# Patient Record
Sex: FEMALE | Race: WHITE | NOT HISPANIC OR LATINO | Employment: FULL TIME | URBAN - METROPOLITAN AREA
[De-identification: names, ages, dates, MRNs, and addresses within clinical notes are randomized per-mention and may not be internally consistent; named-entity substitution may affect disease eponyms.]

---

## 2017-11-02 ENCOUNTER — APPOINTMENT (OUTPATIENT)
Dept: PREADMISSION TESTING | Facility: HOSPITAL | Age: 62
End: 2017-11-02

## 2017-11-02 ENCOUNTER — HOSPITAL ENCOUNTER (OUTPATIENT)
Dept: GENERAL RADIOLOGY | Facility: HOSPITAL | Age: 62
Discharge: HOME | End: 2017-11-02
Admitting: ORTHOPAEDIC SURGERY

## 2017-11-02 VITALS — WEIGHT: 260.8 LBS | HEIGHT: 62 IN | BODY MASS INDEX: 47.99 KG/M2

## 2017-11-02 LAB
ABO GROUP BLD: NORMAL
ALBUMIN SERPL-MCNC: 4.8 G/DL (ref 3.2–4.8)
ALBUMIN/GLOB SERPL: 1.8 G/DL (ref 1.5–2.5)
ALP SERPL-CCNC: 108 U/L (ref 25–100)
ALT SERPL W P-5'-P-CCNC: 25 U/L (ref 7–40)
ANION GAP SERPL CALCULATED.3IONS-SCNC: 11 MMOL/L (ref 3–11)
AST SERPL-CCNC: 19 U/L (ref 0–33)
BACTERIA UR QL AUTO: ABNORMAL /HPF
BASOPHILS # BLD AUTO: 0.03 10*3/MM3 (ref 0–0.2)
BASOPHILS NFR BLD AUTO: 0.5 % (ref 0–1)
BILIRUB SERPL-MCNC: 0.8 MG/DL (ref 0.3–1.2)
BILIRUB UR QL STRIP: NEGATIVE
BLD GP AB SCN SERPL QL: NEGATIVE
BUN BLD-MCNC: 16 MG/DL (ref 9–23)
BUN/CREAT SERPL: 16 (ref 7–25)
CALCIUM SPEC-SCNC: 9.8 MG/DL (ref 8.7–10.4)
CHLORIDE SERPL-SCNC: 105 MMOL/L (ref 99–109)
CLARITY UR: CLEAR
CO2 SERPL-SCNC: 24 MMOL/L (ref 20–31)
COLOR UR: YELLOW
CREAT BLD-MCNC: 1 MG/DL (ref 0.6–1.3)
DEPRECATED RDW RBC AUTO: 45.9 FL (ref 37–54)
EOSINOPHIL # BLD AUTO: 0.15 10*3/MM3 (ref 0–0.3)
EOSINOPHIL NFR BLD AUTO: 2.4 % (ref 0–3)
ERYTHROCYTE [DISTWIDTH] IN BLOOD BY AUTOMATED COUNT: 14 % (ref 11.3–14.5)
GFR SERPL CREATININE-BSD FRML MDRD: 57 ML/MIN/1.73
GLOBULIN UR ELPH-MCNC: 2.6 GM/DL
GLUCOSE BLD-MCNC: 115 MG/DL (ref 70–100)
GLUCOSE UR STRIP-MCNC: NEGATIVE MG/DL
HBA1C MFR BLD: 5.8 % (ref 4.8–5.6)
HCT VFR BLD AUTO: 43.3 % (ref 34.5–44)
HGB BLD-MCNC: 14.4 G/DL (ref 11.5–15.5)
HGB UR QL STRIP.AUTO: NEGATIVE
HYALINE CASTS UR QL AUTO: ABNORMAL /LPF
IMM GRANULOCYTES # BLD: 0.01 10*3/MM3 (ref 0–0.03)
IMM GRANULOCYTES NFR BLD: 0.2 % (ref 0–0.6)
KETONES UR QL STRIP: NEGATIVE
LEUKOCYTE ESTERASE UR QL STRIP.AUTO: NEGATIVE
LYMPHOCYTES # BLD AUTO: 2.59 10*3/MM3 (ref 0.6–4.8)
LYMPHOCYTES NFR BLD AUTO: 41.8 % (ref 24–44)
MCH RBC QN AUTO: 29.6 PG (ref 27–31)
MCHC RBC AUTO-ENTMCNC: 33.3 G/DL (ref 32–36)
MCV RBC AUTO: 88.9 FL (ref 80–99)
MONOCYTES # BLD AUTO: 0.43 10*3/MM3 (ref 0–1)
MONOCYTES NFR BLD AUTO: 6.9 % (ref 0–12)
NEUTROPHILS # BLD AUTO: 2.98 10*3/MM3 (ref 1.5–8.3)
NEUTROPHILS NFR BLD AUTO: 48.2 % (ref 41–71)
NITRITE UR QL STRIP: NEGATIVE
PH UR STRIP.AUTO: 7.5 [PH] (ref 5–8)
PLATELET # BLD AUTO: 314 10*3/MM3 (ref 150–450)
PMV BLD AUTO: 10.8 FL (ref 6–12)
POTASSIUM BLD-SCNC: 3.9 MMOL/L (ref 3.5–5.5)
PROT SERPL-MCNC: 7.4 G/DL (ref 5.7–8.2)
PROT UR QL STRIP: NEGATIVE
RBC # BLD AUTO: 4.87 10*6/MM3 (ref 3.89–5.14)
RBC # UR: ABNORMAL /HPF
REF LAB TEST METHOD: ABNORMAL
RH BLD: POSITIVE
SODIUM BLD-SCNC: 140 MMOL/L (ref 132–146)
SP GR UR STRIP: <=1.005 (ref 1–1.03)
SQUAMOUS #/AREA URNS HPF: ABNORMAL /HPF
UROBILINOGEN UR QL STRIP: NORMAL
WBC NRBC COR # BLD: 6.19 10*3/MM3 (ref 3.5–10.8)
WBC UR QL AUTO: ABNORMAL /HPF

## 2017-11-02 PROCEDURE — 81001 URINALYSIS AUTO W/SCOPE: CPT | Performed by: ORTHOPAEDIC SURGERY

## 2017-11-02 PROCEDURE — 93005 ELECTROCARDIOGRAM TRACING: CPT

## 2017-11-02 PROCEDURE — 83036 HEMOGLOBIN GLYCOSYLATED A1C: CPT | Performed by: ORTHOPAEDIC SURGERY

## 2017-11-02 PROCEDURE — 86900 BLOOD TYPING SEROLOGIC ABO: CPT | Performed by: ORTHOPAEDIC SURGERY

## 2017-11-02 PROCEDURE — 86901 BLOOD TYPING SEROLOGIC RH(D): CPT | Performed by: ORTHOPAEDIC SURGERY

## 2017-11-02 PROCEDURE — 71020 HC CHEST PA AND LATERAL: CPT

## 2017-11-02 PROCEDURE — 36415 COLL VENOUS BLD VENIPUNCTURE: CPT

## 2017-11-02 PROCEDURE — 93010 ELECTROCARDIOGRAM REPORT: CPT | Performed by: INTERNAL MEDICINE

## 2017-11-02 PROCEDURE — 85025 COMPLETE CBC W/AUTO DIFF WBC: CPT | Performed by: ORTHOPAEDIC SURGERY

## 2017-11-02 PROCEDURE — 86850 RBC ANTIBODY SCREEN: CPT | Performed by: ORTHOPAEDIC SURGERY

## 2017-11-02 PROCEDURE — 80053 COMPREHEN METABOLIC PANEL: CPT | Performed by: ORTHOPAEDIC SURGERY

## 2017-11-02 RX ORDER — ROSUVASTATIN CALCIUM 10 MG/1
10 TABLET, COATED ORAL DAILY
COMMUNITY

## 2017-11-02 RX ORDER — TRIAMTERENE AND HYDROCHLOROTHIAZIDE 37.5; 25 MG/1; MG/1
1 CAPSULE ORAL EVERY MORNING
COMMUNITY

## 2017-11-02 RX ORDER — ACETAMINOPHEN,DIPHENHYDRAMINE HCL 500; 25 MG/1; MG/1
1 TABLET, FILM COATED ORAL NIGHTLY PRN
COMMUNITY

## 2017-11-02 ASSESSMENT — KOOS JR: KOOS JR SCORE: 12

## 2017-11-02 NOTE — PAT
Nishi in dr mejia's office notified of pt taking amoxacillin po for sinus infection, last dose 10-31-17, pt denies fever chills, states sinus drainage is clear

## 2017-11-02 NOTE — DISCHARGE INSTRUCTIONS
The following information and instructions were given:    NPO after MN except sips of water with routine prescribed medication (except blood thinner, diabetes, or weight reducing medication) unless otherwise instructed by your physician.  Do not eat, drink, smoke or chew gum after MN the night before surgery. This also includes no mints.    DO NOT shave, wear makeup or dark nail polish.    Remove all jewelry (advised to go to jeweler if unable to remove).    Leave anything you consider valuable at home.    Leave your suitcase in the car until after your surgery.    Bring the following with you (if applicable)   -picture ID and insurance cards   -Co-pay/deductible required by insurance   -Medications in the original bottles (not a list) including all over-the-counter  medications if not brought to PAT   -Copy of advance directive, living will or power of  documents if not  brought to PAT   -CPAP or BIPAP mask and tubing (do not bring machine)   -Skin prep instructions sheet   -PAT Pass   Education booklet, brochure, handout or binder given to patient.    Pain Control After Surgery handout given to patient.    Respirex use (handout given to patient) and pneumonia prevention.    Signs and Symptoms of infection.    DVT Prevention stressing the importance of ambulation.    Patient to apply Chlorhexadine wipes to surgical area (as instructed) the night before procedure and the AM of procedure.

## 2017-11-02 NOTE — PAT
MEASURED FOR TEDS/SCDS IN PAT    CALF MEASUREMENT    20.5in  LENGTH MEASUREMENT 15in    Patient to apply Chlorhexadine wipes  to surgical area (as instructed) the night before procedure and the AM of procedure. Wipes provided.    Pt attended joint class today 11-2-17

## 2017-11-13 ENCOUNTER — ANESTHESIA (OUTPATIENT)
Dept: PERIOP | Facility: HOSPITAL | Age: 62
End: 2017-11-13

## 2017-11-13 ENCOUNTER — HOSPITAL ENCOUNTER (INPATIENT)
Facility: HOSPITAL | Age: 62
LOS: 2 days | Discharge: HOME HEALTH | End: 2017-11-15
Attending: ORTHOPAEDIC SURGERY | Admitting: ORTHOPAEDIC SURGERY

## 2017-11-13 ENCOUNTER — APPOINTMENT (OUTPATIENT)
Dept: GENERAL RADIOLOGY | Facility: HOSPITAL | Age: 62
End: 2017-11-13

## 2017-11-13 ENCOUNTER — ANESTHESIA EVENT (OUTPATIENT)
Dept: PERIOP | Facility: HOSPITAL | Age: 62
End: 2017-11-13

## 2017-11-13 DIAGNOSIS — Z78.9 IMPAIRED MOBILITY AND ADLS: ICD-10-CM

## 2017-11-13 DIAGNOSIS — Z74.09 IMPAIRED FUNCTIONAL MOBILITY, BALANCE, GAIT, AND ENDURANCE: Primary | ICD-10-CM

## 2017-11-13 DIAGNOSIS — Z74.09 IMPAIRED MOBILITY AND ADLS: ICD-10-CM

## 2017-11-13 PROBLEM — E78.5 HLD (HYPERLIPIDEMIA): Status: ACTIVE | Noted: 2017-11-13

## 2017-11-13 PROBLEM — M17.11 ARTHRITIS OF RIGHT KNEE: Status: ACTIVE | Noted: 2017-11-13

## 2017-11-13 PROBLEM — R73.03 PREDIABETES: Status: ACTIVE | Noted: 2017-11-13

## 2017-11-13 PROBLEM — I10 HTN (HYPERTENSION): Status: ACTIVE | Noted: 2017-11-13

## 2017-11-13 LAB
ABO GROUP BLD: NORMAL
BLD GP AB SCN SERPL QL: NEGATIVE
GLUCOSE BLDC GLUCOMTR-MCNC: 135 MG/DL (ref 70–130)
POTASSIUM BLDA-SCNC: 3.59 MMOL/L (ref 3.5–5.3)
RH BLD: POSITIVE

## 2017-11-13 PROCEDURE — 63710000001 INSULIN LISPRO (HUMAN) PER 5 UNITS: Performed by: NURSE PRACTITIONER

## 2017-11-13 PROCEDURE — 73560 X-RAY EXAM OF KNEE 1 OR 2: CPT

## 2017-11-13 PROCEDURE — 25010000003 CEFAZOLIN IN DEXTROSE 2-4 GM/100ML-% SOLUTION: Performed by: ORTHOPAEDIC SURGERY

## 2017-11-13 PROCEDURE — 25010000002 PROPOFOL 1000 MG/ML EMULSION: Performed by: NURSE ANESTHETIST, CERTIFIED REGISTERED

## 2017-11-13 PROCEDURE — 84132 ASSAY OF SERUM POTASSIUM: CPT | Performed by: ANESTHESIOLOGY

## 2017-11-13 PROCEDURE — 25010000002 HYDROMORPHONE PER 4 MG: Performed by: ORTHOPAEDIC SURGERY

## 2017-11-13 PROCEDURE — 97162 PT EVAL MOD COMPLEX 30 MIN: CPT

## 2017-11-13 PROCEDURE — C1713 ANCHOR/SCREW BN/BN,TIS/BN: HCPCS | Performed by: ORTHOPAEDIC SURGERY

## 2017-11-13 PROCEDURE — 25010000002 KETOROLAC TROMETHAMINE PER 15 MG: Performed by: ORTHOPAEDIC SURGERY

## 2017-11-13 PROCEDURE — 25010000002 ROPIVACAINE PER 1 MG: Performed by: NURSE ANESTHETIST, CERTIFIED REGISTERED

## 2017-11-13 PROCEDURE — 86900 BLOOD TYPING SEROLOGIC ABO: CPT | Performed by: ORTHOPAEDIC SURGERY

## 2017-11-13 PROCEDURE — 97116 GAIT TRAINING THERAPY: CPT

## 2017-11-13 PROCEDURE — 82962 GLUCOSE BLOOD TEST: CPT

## 2017-11-13 PROCEDURE — 86901 BLOOD TYPING SEROLOGIC RH(D): CPT | Performed by: ORTHOPAEDIC SURGERY

## 2017-11-13 PROCEDURE — 86850 RBC ANTIBODY SCREEN: CPT | Performed by: ORTHOPAEDIC SURGERY

## 2017-11-13 PROCEDURE — 25010000002 ONDANSETRON PER 1 MG: Performed by: NURSE PRACTITIONER

## 2017-11-13 PROCEDURE — 94799 UNLISTED PULMONARY SVC/PX: CPT

## 2017-11-13 PROCEDURE — C1755 CATHETER, INTRASPINAL: HCPCS | Performed by: ORTHOPAEDIC SURGERY

## 2017-11-13 PROCEDURE — 0SRC0J9 REPLACEMENT OF RIGHT KNEE JOINT WITH SYNTHETIC SUBSTITUTE, CEMENTED, OPEN APPROACH: ICD-10-PCS | Performed by: ORTHOPAEDIC SURGERY

## 2017-11-13 PROCEDURE — C1776 JOINT DEVICE (IMPLANTABLE): HCPCS | Performed by: ORTHOPAEDIC SURGERY

## 2017-11-13 PROCEDURE — 86923 COMPATIBILITY TEST ELECTRIC: CPT

## 2017-11-13 DEVICE — ATTUNE KNEE SYSTEM TIBIAL BASE ROTATING PLATFORM SIZE 5 CEMENTED
Type: IMPLANTABLE DEVICE | Site: KNEE | Status: FUNCTIONAL
Brand: ATTUNE

## 2017-11-13 DEVICE — ATTUNE PATELLA MEDIALIZED ANATOMIC 35MM CEMENTED AOX
Type: IMPLANTABLE DEVICE | Site: KNEE | Status: FUNCTIONAL
Brand: ATTUNE

## 2017-11-13 DEVICE — TOTL KN ATTUNE DEPUY 9527038: Type: IMPLANTABLE DEVICE | Site: KNEE | Status: FUNCTIONAL

## 2017-11-13 DEVICE — ATTUNE KNEE SYSTEM TIBIAL INSERT ROTATING PLATFORM CRUCIATE RETAINING SIZE 5 5MM AOX
Type: IMPLANTABLE DEVICE | Site: KNEE | Status: FUNCTIONAL
Brand: ATTUNE

## 2017-11-13 DEVICE — ATTUNE KNEE SYSTEM FEMORAL CRUCIATE RETAINING SIZE 5 RIGHT CEMENTED
Type: IMPLANTABLE DEVICE | Site: KNEE | Status: FUNCTIONAL
Brand: ATTUNE

## 2017-11-13 DEVICE — SMARTSET HIGH PERFORMANCE MV MEDIUM VISCOSITY BONE CEMENT 40G
Type: IMPLANTABLE DEVICE | Site: KNEE | Status: FUNCTIONAL
Brand: SMARTSET

## 2017-11-13 RX ORDER — OXYCODONE HYDROCHLORIDE AND ACETAMINOPHEN 5; 325 MG/1; MG/1
1 TABLET ORAL EVERY 4 HOURS PRN
Status: DISCONTINUED | OUTPATIENT
Start: 2017-11-13 | End: 2017-11-15 | Stop reason: HOSPADM

## 2017-11-13 RX ORDER — TRANEXAMIC ACID 100 MG/ML
INJECTION, SOLUTION INTRAVENOUS AS NEEDED
Status: DISCONTINUED | OUTPATIENT
Start: 2017-11-13 | End: 2017-11-13 | Stop reason: SURG

## 2017-11-13 RX ORDER — FENTANYL CITRATE 50 UG/ML
50 INJECTION, SOLUTION INTRAMUSCULAR; INTRAVENOUS
Status: DISCONTINUED | OUTPATIENT
Start: 2017-11-13 | End: 2017-11-13 | Stop reason: HOSPADM

## 2017-11-13 RX ORDER — KETOROLAC TROMETHAMINE 15 MG/ML
15 INJECTION, SOLUTION INTRAMUSCULAR; INTRAVENOUS EVERY 6 HOURS PRN
Status: DISPENSED | OUTPATIENT
Start: 2017-11-13 | End: 2017-11-14

## 2017-11-13 RX ORDER — ASPIRIN 325 MG
325 TABLET, DELAYED RELEASE (ENTERIC COATED) ORAL DAILY
Status: DISCONTINUED | OUTPATIENT
Start: 2017-11-14 | End: 2017-11-15 | Stop reason: HOSPADM

## 2017-11-13 RX ORDER — MAGNESIUM HYDROXIDE 1200 MG/15ML
LIQUID ORAL AS NEEDED
Status: DISCONTINUED | OUTPATIENT
Start: 2017-11-13 | End: 2017-11-13 | Stop reason: HOSPADM

## 2017-11-13 RX ORDER — ACETAMINOPHEN 650 MG/1
650 SUPPOSITORY RECTAL ONCE AS NEEDED
Status: DISCONTINUED | OUTPATIENT
Start: 2017-11-13 | End: 2017-11-13 | Stop reason: HOSPADM

## 2017-11-13 RX ORDER — ONDANSETRON 2 MG/ML
4 INJECTION INTRAMUSCULAR; INTRAVENOUS EVERY 6 HOURS PRN
Status: DISCONTINUED | OUTPATIENT
Start: 2017-11-13 | End: 2017-11-15 | Stop reason: HOSPADM

## 2017-11-13 RX ORDER — PROMETHAZINE HYDROCHLORIDE 25 MG/ML
6.25 INJECTION, SOLUTION INTRAMUSCULAR; INTRAVENOUS ONCE AS NEEDED
Status: DISCONTINUED | OUTPATIENT
Start: 2017-11-13 | End: 2017-11-13 | Stop reason: HOSPADM

## 2017-11-13 RX ORDER — LIDOCAINE HYDROCHLORIDE 10 MG/ML
0.5 INJECTION, SOLUTION EPIDURAL; INFILTRATION; INTRACAUDAL; PERINEURAL ONCE AS NEEDED
Status: COMPLETED | OUTPATIENT
Start: 2017-11-13 | End: 2017-11-13

## 2017-11-13 RX ORDER — BISACODYL 10 MG
10 SUPPOSITORY, RECTAL RECTAL DAILY PRN
Status: DISCONTINUED | OUTPATIENT
Start: 2017-11-13 | End: 2017-11-15 | Stop reason: HOSPADM

## 2017-11-13 RX ORDER — ACETAMINOPHEN 160 MG/5ML
650 SOLUTION ORAL EVERY 4 HOURS PRN
Status: DISCONTINUED | OUTPATIENT
Start: 2017-11-13 | End: 2017-11-15 | Stop reason: HOSPADM

## 2017-11-13 RX ORDER — NALOXONE HCL 0.4 MG/ML
0.1 VIAL (ML) INJECTION
Status: DISCONTINUED | OUTPATIENT
Start: 2017-11-13 | End: 2017-11-15 | Stop reason: HOSPADM

## 2017-11-13 RX ORDER — HYDRALAZINE HYDROCHLORIDE 20 MG/ML
10 INJECTION INTRAMUSCULAR; INTRAVENOUS EVERY 6 HOURS PRN
Status: DISCONTINUED | OUTPATIENT
Start: 2017-11-13 | End: 2017-11-15 | Stop reason: HOSPADM

## 2017-11-13 RX ORDER — HYDROMORPHONE HYDROCHLORIDE 1 MG/ML
0.5 INJECTION, SOLUTION INTRAMUSCULAR; INTRAVENOUS; SUBCUTANEOUS
Status: DISCONTINUED | OUTPATIENT
Start: 2017-11-13 | End: 2017-11-13 | Stop reason: HOSPADM

## 2017-11-13 RX ORDER — ROSUVASTATIN CALCIUM 10 MG/1
10 TABLET, COATED ORAL NIGHTLY
Status: DISCONTINUED | OUTPATIENT
Start: 2017-11-13 | End: 2017-11-15 | Stop reason: HOSPADM

## 2017-11-13 RX ORDER — BISACODYL 5 MG/1
10 TABLET, DELAYED RELEASE ORAL DAILY PRN
Status: DISCONTINUED | OUTPATIENT
Start: 2017-11-13 | End: 2017-11-15 | Stop reason: HOSPADM

## 2017-11-13 RX ORDER — DEXTROSE MONOHYDRATE 25 G/50ML
25 INJECTION, SOLUTION INTRAVENOUS
Status: DISCONTINUED | OUTPATIENT
Start: 2017-11-13 | End: 2017-11-15 | Stop reason: HOSPADM

## 2017-11-13 RX ORDER — DOCUSATE SODIUM 100 MG/1
100 CAPSULE, LIQUID FILLED ORAL 2 TIMES DAILY
Status: DISCONTINUED | OUTPATIENT
Start: 2017-11-13 | End: 2017-11-15 | Stop reason: HOSPADM

## 2017-11-13 RX ORDER — OXYCODONE AND ACETAMINOPHEN 7.5; 325 MG/1; MG/1
1 TABLET ORAL ONCE AS NEEDED
Status: DISCONTINUED | OUTPATIENT
Start: 2017-11-13 | End: 2017-11-13 | Stop reason: HOSPADM

## 2017-11-13 RX ORDER — ROPIVACAINE HYDROCHLORIDE 2 MG/ML
12 INJECTION, SOLUTION EPIDURAL; INFILTRATION CONTINUOUS
Status: DISCONTINUED | OUTPATIENT
Start: 2017-11-13 | End: 2017-11-15

## 2017-11-13 RX ORDER — ACETAMINOPHEN 325 MG/1
650 TABLET ORAL EVERY 4 HOURS PRN
Status: DISCONTINUED | OUTPATIENT
Start: 2017-11-13 | End: 2017-11-15 | Stop reason: HOSPADM

## 2017-11-13 RX ORDER — CEFAZOLIN SODIUM 2 G/100ML
2 INJECTION, SOLUTION INTRAVENOUS ONCE
Status: COMPLETED | OUTPATIENT
Start: 2017-11-13 | End: 2017-11-13

## 2017-11-13 RX ORDER — NICOTINE POLACRILEX 4 MG
15 LOZENGE BUCCAL
Status: DISCONTINUED | OUTPATIENT
Start: 2017-11-13 | End: 2017-11-15 | Stop reason: HOSPADM

## 2017-11-13 RX ORDER — FAMOTIDINE 10 MG/ML
20 INJECTION, SOLUTION INTRAVENOUS ONCE
Status: DISCONTINUED | OUTPATIENT
Start: 2017-11-13 | End: 2017-11-13

## 2017-11-13 RX ORDER — HYDROMORPHONE HYDROCHLORIDE 1 MG/ML
0.5 INJECTION, SOLUTION INTRAMUSCULAR; INTRAVENOUS; SUBCUTANEOUS
Status: DISCONTINUED | OUTPATIENT
Start: 2017-11-13 | End: 2017-11-15 | Stop reason: HOSPADM

## 2017-11-13 RX ORDER — PROMETHAZINE HYDROCHLORIDE 25 MG/1
25 SUPPOSITORY RECTAL ONCE AS NEEDED
Status: DISCONTINUED | OUTPATIENT
Start: 2017-11-13 | End: 2017-11-13 | Stop reason: HOSPADM

## 2017-11-13 RX ORDER — SODIUM CHLORIDE 0.9 % (FLUSH) 0.9 %
1-10 SYRINGE (ML) INJECTION AS NEEDED
Status: DISCONTINUED | OUTPATIENT
Start: 2017-11-13 | End: 2017-11-13 | Stop reason: HOSPADM

## 2017-11-13 RX ORDER — ACETAMINOPHEN 325 MG/1
650 TABLET ORAL ONCE AS NEEDED
Status: DISCONTINUED | OUTPATIENT
Start: 2017-11-13 | End: 2017-11-13 | Stop reason: HOSPADM

## 2017-11-13 RX ORDER — BUPIVACAINE HYDROCHLORIDE 5 MG/ML
INJECTION, SOLUTION EPIDURAL; INTRACAUDAL AS NEEDED
Status: DISCONTINUED | OUTPATIENT
Start: 2017-11-13 | End: 2017-11-13 | Stop reason: SURG

## 2017-11-13 RX ORDER — PROMETHAZINE HYDROCHLORIDE 25 MG/1
25 TABLET ORAL ONCE AS NEEDED
Status: DISCONTINUED | OUTPATIENT
Start: 2017-11-13 | End: 2017-11-13 | Stop reason: HOSPADM

## 2017-11-13 RX ORDER — CEFAZOLIN SODIUM 2 G/100ML
2 INJECTION, SOLUTION INTRAVENOUS EVERY 8 HOURS
Status: COMPLETED | OUTPATIENT
Start: 2017-11-13 | End: 2017-11-14

## 2017-11-13 RX ORDER — HYDROCODONE BITARTRATE AND ACETAMINOPHEN 5; 325 MG/1; MG/1
1 TABLET ORAL EVERY 4 HOURS PRN
Status: DISCONTINUED | OUTPATIENT
Start: 2017-11-13 | End: 2017-11-15 | Stop reason: HOSPADM

## 2017-11-13 RX ORDER — SODIUM CHLORIDE 0.9 % (FLUSH) 0.9 %
1-10 SYRINGE (ML) INJECTION AS NEEDED
Status: DISCONTINUED | OUTPATIENT
Start: 2017-11-13 | End: 2017-11-15 | Stop reason: HOSPADM

## 2017-11-13 RX ORDER — SODIUM CHLORIDE, SODIUM LACTATE, POTASSIUM CHLORIDE, CALCIUM CHLORIDE 600; 310; 30; 20 MG/100ML; MG/100ML; MG/100ML; MG/100ML
9 INJECTION, SOLUTION INTRAVENOUS CONTINUOUS
Status: DISCONTINUED | OUTPATIENT
Start: 2017-11-13 | End: 2017-11-15 | Stop reason: HOSPADM

## 2017-11-13 RX ORDER — FAMOTIDINE 20 MG/1
20 TABLET, FILM COATED ORAL ONCE
Status: COMPLETED | OUTPATIENT
Start: 2017-11-13 | End: 2017-11-13

## 2017-11-13 RX ORDER — IPRATROPIUM BROMIDE AND ALBUTEROL SULFATE 2.5; .5 MG/3ML; MG/3ML
3 SOLUTION RESPIRATORY (INHALATION) ONCE AS NEEDED
Status: DISCONTINUED | OUTPATIENT
Start: 2017-11-13 | End: 2017-11-13 | Stop reason: HOSPADM

## 2017-11-13 RX ORDER — SODIUM CHLORIDE 9 MG/ML
150 INJECTION, SOLUTION INTRAVENOUS CONTINUOUS
Status: DISCONTINUED | OUTPATIENT
Start: 2017-11-13 | End: 2017-11-15 | Stop reason: HOSPADM

## 2017-11-13 RX ORDER — ONDANSETRON 2 MG/ML
4 INJECTION INTRAMUSCULAR; INTRAVENOUS ONCE AS NEEDED
Status: DISCONTINUED | OUTPATIENT
Start: 2017-11-13 | End: 2017-11-13 | Stop reason: HOSPADM

## 2017-11-13 RX ADMIN — SODIUM CHLORIDE, POTASSIUM CHLORIDE, SODIUM LACTATE AND CALCIUM CHLORIDE: 600; 310; 30; 20 INJECTION, SOLUTION INTRAVENOUS at 15:01

## 2017-11-13 RX ADMIN — CEFAZOLIN SODIUM 2 G: 2 INJECTION, SOLUTION INTRAVENOUS at 13:58

## 2017-11-13 RX ADMIN — KETOROLAC TROMETHAMINE 15 MG: 15 INJECTION, SOLUTION INTRAMUSCULAR; INTRAVENOUS at 18:33

## 2017-11-13 RX ADMIN — SODIUM CHLORIDE, POTASSIUM CHLORIDE, SODIUM LACTATE AND CALCIUM CHLORIDE 9 ML/HR: 600; 310; 30; 20 INJECTION, SOLUTION INTRAVENOUS at 09:46

## 2017-11-13 RX ADMIN — TRANEXAMIC ACID 1000 MG: 100 INJECTION, SOLUTION INTRAVENOUS at 14:30

## 2017-11-13 RX ADMIN — CEFAZOLIN SODIUM 2 G: 2 INJECTION, SOLUTION INTRAVENOUS at 21:34

## 2017-11-13 RX ADMIN — ROSUVASTATIN CALCIUM 10 MG: 10 TABLET ORAL at 21:34

## 2017-11-13 RX ADMIN — SODIUM CHLORIDE 150 ML/HR: 9 INJECTION, SOLUTION INTRAVENOUS at 19:10

## 2017-11-13 RX ADMIN — BUPIVACAINE HYDROCHLORIDE 11 ML: 5 INJECTION, SOLUTION EPIDURAL; INTRACAUDAL; PERINEURAL at 14:11

## 2017-11-13 RX ADMIN — OXYCODONE AND ACETAMINOPHEN 1 TABLET: 5; 325 TABLET ORAL at 21:34

## 2017-11-13 RX ADMIN — ROPIVACAINE HYDROCHLORIDE 12 ML/HR: 2 INJECTION, SOLUTION EPIDURAL; INFILTRATION at 16:01

## 2017-11-13 RX ADMIN — TRANEXAMIC ACID 1000 MG: 100 INJECTION, SOLUTION INTRAVENOUS at 15:19

## 2017-11-13 RX ADMIN — PROPOFOL 100 MCG/KG/MIN: 10 INJECTION, EMULSION INTRAVENOUS at 14:20

## 2017-11-13 RX ADMIN — HYDROMORPHONE HYDROCHLORIDE 0.5 MG: 1 INJECTION, SOLUTION INTRAMUSCULAR; INTRAVENOUS; SUBCUTANEOUS at 17:51

## 2017-11-13 RX ADMIN — LIDOCAINE HYDROCHLORIDE 0.5 ML: 10 INJECTION, SOLUTION EPIDURAL; INFILTRATION; INTRACAUDAL; PERINEURAL at 09:46

## 2017-11-13 RX ADMIN — FAMOTIDINE 20 MG: 20 TABLET, FILM COATED ORAL at 09:52

## 2017-11-13 RX ADMIN — ONDANSETRON 4 MG: 2 INJECTION INTRAMUSCULAR; INTRAVENOUS at 18:33

## 2017-11-13 NOTE — ANESTHESIA POSTPROCEDURE EVALUATION
Patient: Mel Brady    Procedure Summary     Date Anesthesia Start Anesthesia Stop Room / Location    11/13/17 1358 1601 [facility] / [facility]       Procedure Diagnosis Surgeon Provider    TOTAL KNEE ARTHROPLASTY RIGHT (Right Knee) No diagnosis on file. MD Ernst Tate MD          Anesthesia Type: spinal  Last vitals  BP   110/65 (11/13/17 1555)   Temp   98.4 °F (36.9 °C) (11/13/17 1555)   Pulse   73 (11/13/17 1555)   Resp   16 (11/13/17 1555)     SpO2   100 % (11/13/17 1555)     Post Anesthesia Care and Evaluation    Patient location during evaluation: PACU  Patient participation: complete - patient participated  Level of consciousness: awake and alert  Pain score: 0  Pain management: adequate  Airway patency: patent  Anesthetic complications: No anesthetic complications  PONV Status: none  Cardiovascular status: hemodynamically stable and acceptable  Respiratory status: nonlabored ventilation, acceptable and nasal cannula  Hydration status: acceptable

## 2017-11-13 NOTE — ADDENDUM NOTE
Addendum  created 11/13/17 1652 by Promise Li, TAWANDA    Anesthesia Intra Flowsheets edited, Anesthesia Intra Meds edited, Visit Navigator Flowsheet section accepted

## 2017-11-13 NOTE — THERAPY EVALUATION
Acute Care - Physical Therapy Initial Evaluation   [facility]     Patient Name: Mel Brady  : 1955  MRN: [id number]  Today's Date: 2017   Onset of Illness/Injury or Date of Surgery Date: 17  Date of Referral to PT: 17  Referring Physician: MD Gus      Admit Date: 2017     Visit Dx:    ICD-10-CM ICD-9-CM   1. Impaired functional mobility, balance, gait, and endurance Z74.09 V49.89     Patient Active Problem List   Diagnosis   • Arthritis of right knee   • HTN   • HLD (hyperlipidemia)   • Prediabetes     Past Medical History:   Diagnosis Date   • Arthritis    • Hypercholesteremia    • Hypertension    • PONV (postoperative nausea and vomiting)    • Wears contact lenses    • Wears glasses      Past Surgical History:   Procedure Laterality Date   • COLONOSCOPY     • KNEE ARTHROSCOPY W/ MEDIAL COLLATERAL LIGAMENT (MCL) REPAIR Left    • MEDIAL COLLATERAL LIGAMENT REPAIR, KNEE Right    • NECK SURGERY     • SHOULDER SURGERY Left           PT ASSESSMENT (last 72 hours)      PT Evaluation       17 1801       Rehab Evaluation    Document Type evaluation  -LR     Subjective Information agree to therapy;complains of;pain  -LR     Patient Effort, Rehab Treatment good  -LR     Symptoms Noted During/After Treatment fatigue;increased pain  -LR     General Information    Patient Profile Review yes  -LR     Onset of Illness/Injury or Date of Surgery Date 17  -LR     Referring Physician MD Gus  -LR     General Observations Patient supine in bed upon arrival. IV, R adductor canal nerve catheter, R knee ace bandaged, SCDs. Parents present.   -LR     Pertinent History Of Current Problem Patient presents for surgical management of persistent and progressive R hip pain and dysfunction that has failed to improve with conservative management.   -LR     Precautions/Limitations fall precautions;other (see comments)   R adductor canal nerve catheter.  -LR     Prior Level of Function  min assist:;all household mobility;community mobility;gait;transfer;bed mobility;home management;cooking;cleaning;shopping;using stairs;independent:;driving   all mobility limited by pain  -LR     Equipment Currently Used at Home bath bench;raised toilet;walker, rolling;cane, straight   not currently using  -LR     Plans/Goals Discussed With patient and family;agreed upon  -LR     Risks Reviewed patient and family:;LOB;nausea/vomiting;dizziness;increased discomfort;lines disloged  -LR     Benefits Reviewed patient and family:;improve function;increase independence;increase strength;increase balance;decrease pain;increase knowledge  -LR     Barriers to Rehab previous functional deficit  -LR     Living Environment    Lives With alone  -LR     Living Arrangements house  -LR     Home Accessibility bed and bath on same level;house is not wheelchair accessible;stairs to enter home;tub/shower is not walk in  -LR     Number of Stairs to Enter Home 3  -LR     Stair Railings at Home outside, present on left side  -LR     Type of Financial/Environmental Concern none  -LR     Transportation Available family or friend will provide  -LR     Living Environment Comment Patient's parents will be available to assist her at all times upon d/c home.   -LR     Clinical Impression    Date of Referral to PT 11/13/17  -LR     PT Diagnosis s/p elective R TKA  -LR     Prognosis good  -LR     Patient/Family Goals Statement go home, decrease pain  -LR     Criteria for Skilled Therapeutic Interventions Met yes;treatment indicated  -LR     Rehab Potential good, to achieve stated therapy goals  -LR     Pain Assessment    Pain Assessment 0-10  -LR     Pain Score 4  -LR     Post Pain Score 5  -LR     Pain Type Acute pain  -LR     Pain Location Knee  -LR     Pain Orientation Right;Anterior  -LR     Pain Intervention(s) Repositioned;Ambulation/increased activity  -LR     Vision Assessment/Intervention    Visual Impairment WFL with corrective lenses   -LR     Cognitive Assessment/Intervention    Current Cognitive/Communication Assessment functional  -LR     Orientation Status oriented x 4;required verbal cueing (specifiy in comments)  -LR     Follows Commands/Answers Questions 100% of the time;able to follow single-step instructions;needs cueing;needs repetition  -LR     Personal Safety WNL/WFL  -LR     ROM (Range of Motion)    General ROM lower extremity range of motion deficits identified  -LR     General LE Assessment    ROM LLE ROM was WFL;knee, right: LE ROM deficit  -LR     ROM Detail R knee AROM impaired 25%  -LR     MMT (Manual Muscle Testing)    General MMT Assessment lower extremity strength deficits identified  -LR     Lower Extremity    Lower Ext Manual Muscle Testing left LE strength is WFL;right knee strength deficit  -LR     Lower Ext Manual Muscle Testing Detail R knee functionally 4-/5  -LR     Mobility Assessment/Training    Extremity Weight-Bearing Status right lower extremity  -LR     Right Lower Extremity Weight-Bearing weight-bearing as tolerated  -LR     Bed Mobility, Assessment/Treatment    Bed Mobility, Assistive Device head of bed elevated;bed rails  -LR     Bed Mob, Supine to Sit, [place] verbal cues required;minimum assist (75% patient effort)  -LR     Bed Mob, Sit to Supine, [place]er not tested   UIC at end of eval.   -LR     Bed Mobility, Safety Issues decreased use of legs for bridging/pushing  -LR     Bed Mobility, Impairments ROM decreased;strength decreased;pain  -LR     Bed Mobility, Comment Verbal cues to move LEs towards EOB and to push up from bed from behind to raise trunk into sitting and to scoot hips out to get feet on floor. Min assist required to move R LE. Denied dizziness upon sitting up.   -LR     Transfer Assessment/Treatment    Transfers, Sit-Stand [place]er verbal cues required;contact guard assist;2 person assist required  -LR     Transfers, Stand-Sit [place] verbal cues required;contact guard  assist;2 person assist required  -LR     Transfers, Sit-Stand-Sit, Assist Device rolling walker;elevated surface  -LR     Transfer, Safety Issues sequencing ability decreased;step length decreased;weight-shifting ability decreased  -LR     Transfer, Impairments ROM decreased;strength decreased;pain  -LR     Transfer, Comment Verbal cues to push up from bed to stand and to reach back for chair to lower into sitting. Verbal cues to step R LE out before t/f for comfort.   -LR     Gait Assessment/Treatment    Gait, [place] Level verbal cues required;contact guard assist;2 person assist required  -LR     Gait, Assistive Device rolling walker  -LR     Gait, Distance (Feet) 20  -LR     Gait, Gait Pattern Analysis swing-to gait  -LR     Gait, Gait Deviations right:;antalgic;forward flexed posture;step length decreased;toe-to-floor clearance decreased;weight-shifting ability decreased  -LR     Gait, Safety Issues sequencing ability decreased;step length decreased;weight-shifting ability decreased  -LR     Gait, Impairments ROM decreased;strength decreased;pain  -LR     Gait, Comment Patient ambulated with step to gait pattern at slow pace. Verbal cues for correct sequencing of steps, increased R LE weight bearing, and decreased UE weight bearing. Gait limited by fatigue and had to have chair brought up behind her to sit.   -LR     Therapy Exercises    Exercise Protocols total knee  -LR     Total Knee Exercises right:;10 reps;ankle pumps/circles;quad set;glut set   cues for technique  -LR     Sensory Assessment/Intervention    Sensory Impairment --   denies numbness/tingling;able to actively DF bilaterally  -LR     Positioning and Restraints    Pre-Treatment Position in bed  -LR     Post Treatment Position chair  -LR     In Chair notified nsg;reclined;sitting;call light within reach;encouraged to call for assist;exit alarm on;with family/caregiver;compression device;legs elevated  -LR       User Key  (r) = Recorded By,  (t) = Taken By, (c) = Cosigned By    Initials Name Provider Type    LR Allison Saunders, PT Physical Therapist          Physical Therapy Education     Title: PT OT SLP Therapies (Done)     Topic: Physical Therapy (Done)     Point: Mobility training (Done)    Learning Progress Summary    Learner Readiness Method Response Comment Documented by Status   Patient Acceptance E,D VU,NR Educated on weight bearing status and precautions. LR 11/13/17 1832 Done   Mother Acceptance E,D VU,NR Educated on weight bearing status and precautions. LR 11/13/17 1832 Done   Father Acceptance E,D VU,NR Educated on weight bearing status and precautions. LR 11/13/17 1832 Done               Point: Home exercise program (Done)    Learning Progress Summary    Learner Readiness Method Response Comment Documented by Status   Patient Acceptance E,D VU,NR Educated on weight bearing status and precautions. LR 11/13/17 1832 Done   Mother Acceptance E,D VU,NR Educated on weight bearing status and precautions. LR 11/13/17 1832 Done   Father Acceptance E,D VU,NR Educated on weight bearing status and precautions. LR 11/13/17 1832 Done               Point: Body mechanics (Done)    Learning Progress Summary    Learner Readiness Method Response Comment Documented by Status   Patient Acceptance E,D VU,NR Educated on weight bearing status and precautions. LR 11/13/17 1832 Done   Mother Acceptance E,D VU,NR Educated on weight bearing status and precautions. LR 11/13/17 1832 Done   Father Acceptance E,D VU,NR Educated on weight bearing status and precautions. LR 11/13/17 1832 Done               Point: Precautions (Done)    Learning Progress Summary    Learner Readiness Method Response Comment Documented by Status   Patient Acceptance E,D VU,NR Educated on weight bearing status and precautions. LR 11/13/17 1832 Done   Mother Acceptance E,D VU,NR Educated on weight bearing status and precautions. LR 11/13/17 1832 Done   Father Acceptance E,D VU,NR Educated  on weight bearing status and precautions. LR 11/13/17 1832 Done                      User Key     Initials Effective Dates Name Provider Type Discipline    LR 06/19/15 -  Allison Saunders, PT Physical Therapist PT                PT Recommendation and Plan  Anticipated Equipment Needs At Discharge:  (none)  Anticipated Discharge Disposition: home with assist, home with home health  Planned Therapy Interventions: balance training, bed mobility training, gait training, home exercise program, patient/family education, ROM (Range of Motion), stair training, strengthening, transfer training  PT Frequency: 2 times/day  Plan of Care Review  Plan Of Care Reviewed With: patient, father, mother  Progress: progress towards functional goals is fair  Outcome Summary/Follow up Plan: Patient ambulated 20 feet with RW, limited by pain and fatigue. Plan is d/c home with HHPT. ROM to be initiated POD#1. Will continue to progress as able.           IP PT Goals       11/13/17 1801          Bed Mobility PT LTG    Bed Mobility PT LTG, Date Established 11/13/17  -LR      Bed Mobility PT LTG, Time to Achieve 3 days  -LR      Bed Mobility PT LTG, Activity Type supine to sit/sit to supine  -LR      Bed Mobility PT LTG, [place] Level conditional independence  -LR      Bed Mobility PT LTG, Date Goal Reviewed 11/13/17  -LR      Bed Mobility PT LTG, Outcome goal ongoing  -LR      Transfer Training PT LTG    Transfer Training PT LTG, Date Established 11/13/17  -LR      Transfer Training PT LTG, Time to Achieve 3 days  -LR      Transfer Training PT LTG, Activity Type sit to stand/stand to sit  -LR      Transfer Training PT LTG, [place] Level conditional independence  -LR      Transfer Training PT LTG, Assist Device walker, rolling  -LR      Transfer Training PT  LTG, Date Goal Reviewed 11/13/17  -LR      Transfer Training PT LTG, Outcome goal ongoing  -LR      Gait Training PT LTG    Gait Training Goal PT LTG, Date Established  11/13/17  -LR      Gait Training Goal PT LTG, Time to Achieve 3 days  -LR      Gait Training Goal PT LTG, [place] Level conditional independence  -LR      Gait Training Goal PT LTG, Assist Device walker, rolling  -LR      Gait Training Goal PT LTG, Distance to Achieve 500 feet  -LR      Gait Training Goal PT LTG, Date Goal Reviewed 11/13/17  -LR      Gait Training Goal PT LTG, Outcome goal ongoing  -LR      Stair Training PT LTG    Stair Training Goal PT LTG, Date Established 11/13/17  -LR      Stair Training Goal PT LTG, Time to Achieve 3 days  -LR      Stair Training Goal PT LTG, Number of Steps 3  -LR      Stair Training Goal PT LTG, [place] Level contact guard assist  -LR      Stair Training Goal PT LTG, Assist Device 1 handrail;cane, straight  -LR      Stair Training Goal PT LTG, Date Goal Reviewed 11/13/17  -LR      Stair Training Goal PT LTG, Outcome goal ongoing  -LR      Range of Motion PT LTG    Range of Motion Goal PT LTG, Date Established 11/13/17  -LR      Range of Motion Goal PT LTG, Time to Achieve 3 days  -LR      Range fo Motion Goal PT LTG, Joint R knee  -LR      Range of Motion Goal PT LTG, AAROM Measure 0-90 degrees  -LR      Range of Motion Goal PT LTG, Date Goal Reviewed 11/13/17  -LR      Range of Motion Goal PT LTG, Outcome goal ongoing  -LR        User Key  (r) = Recorded By, (t) = Taken By, (c) = Cosigned By    Initials Name Provider Type    LR Allison Saunders, PT Physical Therapist                Outcome Measures       11/13/17 1801          How much help from another person do you currently need...    Turning from your back to your side while in flat bed without using bedrails? 3  -LR      Moving from lying on back to sitting on the side of a flat bed without bedrails? 3  -LR      Moving to and from a bed to a chair (including a wheelchair)? 3  -LR      Standing up from a chair using your arms (e.g., wheelchair, bedside chair)? 3  -LR      Climbing 3-5 steps with a  railing? 1  -LR      To walk in hospital room? 3  -LR      AM-PAC 6 Clicks Score 16  -LR      Functional Assessment    Outcome Measure Options AM-PAC 6 Clicks Basic Mobility (PT)  -LR        User Key  (r) = Recorded By, (t) = Taken By, (c) = Cosigned By    Initials Name Provider Type    LR Allison Saunders, PT Physical Therapist           Time Calculation:         PT Charges       11/13/17 1835          Time Calculation    Start Time 1801  -LR      PT Received On 11/13/17  -LR      PT Goal Re-Cert Due Date 11/23/17  -LR      Time Calculation- PT    Total Timed Code Minutes- PT 8 minute(s)  -LR        User Key  (r) = Recorded By, (t) = Taken By, (c) = Cosigned By    Initials Name Provider Type    LR Allison Saunders, PT Physical Therapist          Therapy Charges for Today     Code Description Service Date Service Provider Modifiers Qty    52709592251 HC GAIT TRAINING EA 15 MIN 11/13/2017 Allison Saunders, PT GP 1    26351502616 HC PT THER SUPP EA 15 MIN 11/13/2017 Allison Saunders, PT GP 3    34888169963 HC PT EVAL MOD COMPLEXITY 3 11/13/2017 Allison Saunders, PT GP 1          PT G-Codes  Outcome Measure Options: AM-PAC 6 Clicks Basic Mobility (PT)      Allison Saunders, PT  11/13/2017

## 2017-11-13 NOTE — OP NOTE
TOTAL KNEE ARTHROPLASTY  Progress Note    Mel Brady  11/13/2017    Pre-op Diagnosis:   Right knee OA       Post-Op Diagnosis Codes:  Right knee OA    Procedure/CPT® Codes:  55010    Procedure(s):  TOTAL KNEE ARTHROPLASTY RIGHT    Surgeon(s):  Levy Weber MD    Anesthesia: * No anesthesia type entered *    Staff:   Circulator: Margaret Ahuja RN  Scrub Person: Edmund Ramirez; Ronnie Chris  Vendor Representative: Gus Shah  Nursing Assistant: Edmond Perrin; Laya Wren  Assistant: Nicky Caceres PA-C  [place]: Lesly Fuller RN; Manish Barrientos    Estimated Blood Loss: 100 ml    Urine Voided: * No values recorded between 11/13/2017  1:58 PM and 11/13/2017  3:42 PM *    Specimens:                None      Drains:            Findings: Expected     Complications: None     Implants: Depuy Attune System                        Cemented Femur Cruciate Retaining style size 5                        Cemented Tibia size 5 with 5 mm rotating spacer                        Cemented all polyethylene Patella size 35     Indications:  62-year-old woman with endstage right knee osteoarthritic symptoms and corresponding x-ray findings with moderate to severe medial compartment changes. She had distant open medial meniscectomy. Risks benefits and indications and rationale for right total knee arthroplasty discussed at length with patient. She is aware of possible mechanical or infectious complications for TKA as well as possible perioperative medical complication. She signs her own consent after all questions are obtained.     Procedure:  While in the OR spinal anesthesia started with satisfactory level. Turned to the supine position and prepped and draped in standard fashion from mid-thigh to the ankle using the sliding leg cabrera. Standard anterior incision made medial to the patella. Medial parapatellar arthrotomy performed. Patella was everted after adequate anterolateral debridement. High grade  medial compartment changes noted and lesser grade patellofemoral and lateral compartment changes. Proximal tibia exposed with circumferential meniscectomy. ACL was removed. PCL was preserved. Proximal tibia cut made nearly perpendicular to the mechanical axis. Minor medial joint line osteophytes removed. Standard distal femoral preparation with intramedullary guides. Femur sized to #5 and tibia also to #5. Chamfer and sulcus cuts made after establishing good flexion and extension gaps. Tibia prepared in standard fashion. Patella cut with jig leaving 12 to 14 mm remnant. Patellar trial positioned and prepared accordingly. Trials at this point showed easy motion, patellar tracking and varus-valgus stability. Motion demonstrated 0/0/140 with optimal deep flexion stability with the 5 mm spacer. Trials removed and bone surfaces thoroughly irrigated. Tourniquet inflated just prior to cementations at 300 mg Hg with total tourniquet time approximately 35 minutes. Standard cement technique used for femoral and tibial  and patellar components with excess cement removed during the curing process. Final components assembled and reduced with stability and range of motion and patellar tracking similar to the trials. Wound was thoroughly irrigated and closed in layers without a drain. Standard Prineo dressing applied. Standard compression dressing applied. Final counts were correct. Patient stable to recovery having tolerated procedure well throughout.     Levy Weber MD    Date: 11/13/2017  Time: 3:44 PM

## 2017-11-13 NOTE — PLAN OF CARE
Problem: Patient Care Overview (Adult)  Goal: Plan of Care Review  Outcome: Ongoing (interventions implemented as appropriate)    11/13/17 1801   Coping/Psychosocial Response Interventions   Plan Of Care Reviewed With patient;father;mother   Patient Care Overview   Progress progress towards functional goals is fair   Outcome Evaluation   Outcome Summary/Follow up Plan Patient ambulated 20 feet with RW, limited by pain and fatigue. Plan is d/c home with HHPT. ROM to be initiated POD#1. Will continue to progress as able.          Problem: Inpatient Physical Therapy  Goal: Bed Mobility Goal LTG- PT  Outcome: Ongoing (interventions implemented as appropriate)    11/13/17 1801   Bed Mobility PT LTG   Bed Mobility PT LTG, Date Established 11/13/17   Bed Mobility PT LTG, Time to Achieve 3 days   Bed Mobility PT LTG, Activity Type supine to sit/sit to supine   Bed Mobility PT LTG, [place] Level conditional independence   Bed Mobility PT LTG, Date Goal Reviewed 11/13/17   Bed Mobility PT LTG, Outcome goal ongoing       Goal: Transfer Training Goal 1 LTG- PT  Outcome: Ongoing (interventions implemented as appropriate)    11/13/17 1801   Transfer Training PT LTG   Transfer Training PT LTG, Date Established 11/13/17   Transfer Training PT LTG, Time to Achieve 3 days   Transfer Training PT LTG, Activity Type sit to stand/stand to sit   Transfer Training PT LTG, [place] conditional independence   Transfer Training PT LTG, Assist Device walker, rolling   Transfer Training PT LTG, Date Goal Reviewed 11/13/17   Transfer Training PT LTG, Outcome goal ongoing       Goal: Gait Training Goal LTG- PT  Outcome: Ongoing (interventions implemented as appropriate)    11/13/17 1801   Gait Training PT LTG   Gait Training Goal PT LTG, Date Established 11/13/17   Gait Training Goal PT LTG, Time to Achieve 3 days   Gait Training Goal PT LTG, [place] conditional independence   Gait Training Goal PT LTG, Assist Device  walker, rolling   Gait Training Goal PT LTG, Distance to Achieve 500 feet   Gait Training Goal PT LTG, Date Goal Reviewed 11/13/17   Gait Training Goal PT LTG, Outcome goal ongoing       Goal: Stair Training Goal LTG- PT  Outcome: Ongoing (interventions implemented as appropriate)    11/13/17 1801   Stair Training PT LTG   Stair Training Goal PT LTG, Date Established 11/13/17   Stair Training Goal PT LTG, Time to Achieve 3 days   Stair Training Goal PT LTG, Number of Steps 3   Stair Training Goal PT LTG, [place] Level contact guard assist   Stair Training Goal PT LTG, Assist Device 1 handrail;cane, straight   Stair Training Goal PT LTG, Date Goal Reviewed 11/13/17   Stair Training Goal PT LTG, Outcome goal ongoing       Goal: Range of Motion Goal LTG- PT  Outcome: Ongoing (interventions implemented as appropriate)    11/13/17 1801   Range of Motion PT LTG   Range of Motion Goal PT LTG, Date Established 11/13/17   Range of Motion Goal PT LTG, Time to Achieve 3 days   Range fo Motion Goal PT LTG, Joint R knee   Range of Motion Goal PT LTG, AAROM Measure 0-90 degrees   Range of Motion Goal PT LTG, Date Goal Reviewed 11/13/17   Range of Motion Goal PT LTG, Outcome goal ongoing

## 2017-11-13 NOTE — ADDENDUM NOTE
Addendum  created 11/13/17 1701 by Promise Li, TAWANDA    Anesthesia Intra Blocks edited, Child order released for a procedure order, Order Canceled from Note, Visit Navigator Flowsheet section accepted

## 2017-11-13 NOTE — H&P
Pre-Op H&P  Mel Brady  [id number]  1955    Chief complaint: Right knee pain    HPI:    Patient is a 62 y.o.female presents with right knee pain and found to have primary OA of right knee and here today for right total knee arthroplasty     Review of Systems:  General ROS: negative for chills, fever or skin lesions;  No changes since last office visit  Cardiovascular ROS: no chest pain or dyspnea on exertion  Respiratory ROS: no cough, shortness of breath, or wheezing    Allergies: No Known Allergies    Home Meds:    Prescriptions Prior to Admission   Medication Sig Dispense Refill Last Dose   • Cetirizine HCl (ZYRTEC ALLERGY PO) Take 10 mg by mouth Daily As Needed (allergies).   Past Week at Unknown time   • diphenhydrAMINE-acetaminophen (TYLENOL PM)  MG tablet per tablet Take 1 tablet by mouth At Night As Needed for Sleep.   Past Month at Unknown time   • rosuvastatin (CRESTOR) 10 MG tablet Take 10 mg by mouth Daily.   11/12/2017 at 2200   • triamterene-hydrochlorothiazide (DYAZIDE) 37.5-25 MG per capsule Take 1 capsule by mouth Every Morning.   11/13/2017 at 0700       PMH:   Past Medical History:   Diagnosis Date   • Arthritis    • Hypercholesteremia    • Hypertension    • PONV (postoperative nausea and vomiting)    • Wears contact lenses    • Wears glasses      PSH:    Past Surgical History:   Procedure Laterality Date   • COLONOSCOPY  2015   • KNEE ARTHROSCOPY W/ MEDIAL COLLATERAL LIGAMENT (MCL) REPAIR Left 1980   • MEDIAL COLLATERAL LIGAMENT REPAIR, KNEE Right 1978   • NECK SURGERY     • SHOULDER SURGERY Left        Immunization History:  Influenza: yes 2017  Pneumococcal: no  Tetanus: unknown    Social History:   Tobacco:   History   Smoking Status   • Never Smoker   Smokeless Tobacco   • Never Used      Alcohol:     History   Alcohol Use   • Yes     Comment: rare, special occasions        Vitals:           /72 (BP Location: Right arm, Patient Position: Lying)  Pulse 68  Temp 97.5 °F  "(36.4 °C) (Temporal Artery )   Resp 18  Ht 62\" (157.5 cm)  Wt 260 lb (118 kg)  SpO2 99%  BMI 47.55 kg/m2    Physical Exam:  General Appearance:    Alert, cooperative, no distress, appears stated age   Head:    Normocephalic, without obvious abnormality, atraumatic   Lungs:     Clear to auscultation bilaterally, respirations unlabored    Heart:   Regular rate and rhythm, S1 and S2 normal, no murmur, rub    or gallop    Abdomen:    Soft, non-tender.  +bowel sounds   Breast Exam:    deferred   Genitalia:    deferred   Extremities:   Extremities normal, atraumatic, no cyanosis or edema   Skin:   Skin color, texture, turgor normal, no rashes or lesions   Neurologic:   Grossly intact   Results Review  I reviewed the patient's new clinical results.    Cancer Staging (if applicable)  Cancer Patient: __ yes _x_no __unknown; If yes, clinical stage T:__ N:__M:__, stage group or __N/A    Impression: Primary OA of right knee    Plan: Right total knee arthroplasty    Linda Williamson, APRN   11/13/2017   10:49 AM  "

## 2017-11-13 NOTE — ANESTHESIA PREPROCEDURE EVALUATION
Anesthesia Evaluation     Patient summary reviewed and Nursing notes reviewed   history of anesthetic complications: PONV  NPO Solid Status: > 8 hours  NPO Liquid Status: > 8 hours     Airway   Mallampati: II  TM distance: >3 FB  Neck ROM: full  Dental      Pulmonary    (+) shortness of breath,   (-) pneumonia, COPD, asthma  Cardiovascular     ECG reviewed    (+) hypertension, hyperlipidemia  (-) past MI, CAD, angina, cardiac stents    ROS comment: Normal EKG NSR    Neuro/Psych  (-) seizures, CVA  GI/Hepatic/Renal/Endo    (+) morbid obesity,   (-) liver disease, no renal disease, hypothyroidism    Musculoskeletal     (+) neck pain,   Myalgias: sp neck surgery   Abdominal    Substance History      OB/GYN          Other   (+) arthritis                                 Anesthesia Plan    ASA 3     spinal   (Propofol sedation    ACB/cath  post op.   )  intravenous induction   Anesthetic plan and risks discussed with patient.    Plan discussed with CRNA.

## 2017-11-13 NOTE — ANESTHESIA PROCEDURE NOTES
Peripheral Block    Patient location during procedure: post-op  Reason for block: at surgeon's request and post-op pain management  Preanesthetic Checklist  Completed: patient identified, site marked, surgical consent, pre-op evaluation, timeout performed, IV checked, risks and benefits discussed and monitors and equipment checked  Prep:  Sterile barriers:cap, gloves, mask and sterile barriers  Prep: ChloraPrep  Patient monitoring: blood pressure monitoring, continuous pulse oximetry and EKG  Procedure    Guidance:ultrasound guided  Images:still images obtained    Block Type:adductor canal block  Injection Technique:catheter  Needle Type:Tuohy and echogenic  Needle Gauge:18 G    Catheter Size:20 G (20g)  Medications  Local Injected:bupivacaine 0.25% Local Amount Injected:30 (ml)mL  Post Assessment  Injection Assessment: negative aspiration for heme, incremental injection and no paresthesia on injection  Patient Tolerance:comfortable throughout block  Complications:no  Additional Notes  Procedure:             The pt was placed in the Supine position.  The Insertion site was  prepped and Draped in sterile fashion.  The pt was anesthetized with  IV Sedation( see meds).  Skin and cutaneous tissue was infiltrated and anesthetized with 1% Lidocaine 3 mls via a 25g needle.  A BBraun 4 inch 18g echogenic needle was then  inserted approximately midline, mid-thigh and advanced In-plane with Ultrasound guidance.  Normal Saline PSF was utilized for hydrodissection of tissue.  The Vastus medialis and Sartorius muscle where visualized and the needle tip was placed in the adductor canal,  lateral to the femoral artery.  LA injection spread was visualized, injection was incremental 1-5ml, injection pressure was normal or little, no intraneural injection, no vascular injection.  LA dose was injected thru the needle(see dose above).  A BBraun 20g wire stylet catheter was placed via the needle with ultrasound visualization and  confirmation with NS fluid bolus. The labeled Catheter was then secured to skin at insertion site with skin afix and steristrips to curled catheter and CHG transparent dressing.  Thank you.

## 2017-11-13 NOTE — ANESTHESIA PROCEDURE NOTES
Spinal Block    Patient location during procedure: OR  Indication:at surgeon's request  Performed By  CRNA: NOVA ESPINOZA  Preanesthetic Checklist  Completed: patient identified, site marked, surgical consent, pre-op evaluation, timeout performed, IV checked, risks and benefits discussed and monitors and equipment checked  Spinal Block Prep:  Patient Position:sitting  Sterile Tech:cap, gloves, sterile barriers and mask  Prep:Chloraprep  Patient Monitoring:blood pressure monitoring, continuous pulse oximetry and EKG  Spinal Block Procedure  Approach:midline  Guidance:landmark technique and palpation technique  Location:L4-L5  Needle Type:Laney  Needle Gauge:25 G  Placement of Spinal needle event:cerebrospinal fluid aspirated  Paresthesia: no  Fluid Appearance:clear  Post Assessment  Patient Tolerance:patient tolerated the procedure well with no apparent complications  Complications no  Additional Notes  Procedure:  Pt assisted to sitting position, with legs in position of comfort over side of bed.  Pt. instructed in optimal spine presentation, the spine was prepped/ Draped and the skin at insertion site was anesthetized with 1% Lidocaine 2 ml.  The spinal needle was then advanced until CSF flow was obtained and LA was injected:      Marcaine 0.5% PSF injected 11 Mg.

## 2017-11-13 NOTE — H&P
Patient Name: Mel Brady  MRN: [id number]  : 1955  DOS: 2017    Attending: Levy Weber MD    Primary Care Provider: Chrissy Guerra MD      Chief complaint:  Right knee pain    Subjective   Patient is a 62 y.o. female presented for scheduled surgery by Dr. Weber. She anticipates a right total knee replacement today. Her knee has been painful for about 12 years. Conservative treatments have failed to provide long term pain relief. She denies use of assistive device for ambulation.    When seen in preop she is doing well. She denies pain. She denies nausea, shortness of breath or chest pain. No hx of DVT or PE.    ( Above is noted and agree. Pt underwent right total knee arthroplasty under spinal anesthesia, tolerated well, was admitted for further management.    Seen in her room afterwards, she c/o pain, anesthesia at bedside working on her block. No f/c/sob. Has not yet ambulated.)wy     Allergies:  No Known Allergies    Meds:  Prescriptions Prior to Admission   Medication Sig Dispense Refill Last Dose   • Cetirizine HCl (ZYRTEC ALLERGY PO) Take 10 mg by mouth Daily As Needed (allergies).   Past Week at Unknown time   • diphenhydrAMINE-acetaminophen (TYLENOL PM)  MG tablet per tablet Take 1 tablet by mouth At Night As Needed for Sleep.   Past Month at Unknown time   • rosuvastatin (CRESTOR) 10 MG tablet Take 10 mg by mouth Daily.   2017 at 2200   • triamterene-hydrochlorothiazide (DYAZIDE) 37.5-25 MG per capsule Take 1 capsule by mouth Every Morning.   2017 at 0700       History:   Past Medical History:   Diagnosis Date   • Arthritis    • Hypercholesteremia    • Hypertension    • PONV (postoperative nausea and vomiting)    • Wears contact lenses    • Wears glasses      Past Surgical History:   Procedure Laterality Date   • COLONOSCOPY     • KNEE ARTHROSCOPY W/ MEDIAL COLLATERAL LIGAMENT (MCL) REPAIR Left    • MEDIAL COLLATERAL LIGAMENT REPAIR, KNEE Right    • NECK  "SURGERY     • SHOULDER SURGERY Left      Family History   Problem Relation Age of Onset   • No Known Problems Mother    • No Known Problems Father      Social History   Substance Use Topics   • Smoking status: Never Smoker   • Smokeless tobacco: Never Used   • Alcohol use Yes      Comment: rare, special occasions    She lives alone and has no children. She works for [employer]( [place])    Review of Systems  Pertinent items are noted in HPI, all other systems reviewed and negative    Vital Signs  /72 (BP Location: Left arm, Patient Position: Lying)  Pulse 64  Temp 98.5 °F (36.9 °C)  Resp 16  Ht 62\" (157.5 cm)  Wt 260 lb (118 kg)  SpO2 99%  BMI 47.55 kg/m2    Physical Exam:    General Appearance:    Alert, cooperative, in no acute distress   Head:    Normocephalic, without obvious abnormality, atraumatic   Eyes:            Lids and lashes normal, conjunctivae and sclerae normal, no   icterus, no pallor, corneas clear    Ears:    Ears appear intact with no abnormalities noted   Neck:   No adenopathy, supple, trachea midline, no thyromegaly, no     carotid bruit, no JVD   Lungs:     Clear to auscultation,respirations regular, even and                   unlabored    Heart:    Regular rhythm and normal rate, normal S1 and S2, no            Murmur    Abdomen:     Normal bowel sounds, no masses, no organomegaly, soft        non-tender, non-distended, no guarding, no rebound                 tenderness   Genitalia:    Deferred   Extremities:   Moves all extremities well, no edema, no cyanosis, no              Redness   Postoperatively: Right knee with clean dry and intact Ace wrap in place.  Right thigh adductor canal catheter present.     Pulses:   Pulses palpable and equal bilaterally   Skin:   No bleeding, bruising or rash   Neurologic:   Cranial nerves 2 - 12 grossly intact, sensation intact   ( Postop exam: Intact flexion and dorsiflexion bilateral feet.  )      I reviewed the patient's new " clinical results.     Results for YANNA MENDOZA (MRN [id number]) as of 11/13/2017 13:48   Ref. Range 11/2/2017 10:32   Glucose Latest Ref Range: 70 - 100 mg/dL 115 (H)   Sodium Latest Ref Range: 132 - 146 mmol/L 140   Potassium Latest Ref Range: 3.5 - 5.5 mmol/L 3.9   CO2 Latest Ref Range: 20.0 - 31.0 mmol/L 24.0   Chloride Latest Ref Range: 99 - 109 mmol/L 105   Anion Gap Latest Ref Range: 3.0 - 11.0 mmol/L 11.0   Creatinine Latest Ref Range: 0.60 - 1.30 mg/dL 1.00   BUN Latest Ref Range: 9 - 23 mg/dL 16   BUN/Creatinine Ratio Latest Ref Range: 7.0 - 25.0  16.0   Calcium Latest Ref Range: 8.7 - 10.4 mg/dL 9.8   eGFR Non African Amer Latest Ref Range: >60 mL/min/1.73 57 (L)   Alkaline Phosphatase Latest Ref Range: 25 - 100 U/L 108 (H)   Total Protein Latest Ref Range: 5.7 - 8.2 g/dL 7.4   ALT (SGPT) Latest Ref Range: 7 - 40 U/L 25   AST (SGOT) Latest Ref Range: 0 - 33 U/L 19   Total Bilirubin Latest Ref Range: 0.3 - 1.2 mg/dL 0.8   Albumin Latest Ref Range: 3.20 - 4.80 g/dL 4.80   Globulin Latest Units: gm/dL 2.6   A/G Ratio Latest Ref Range: 1.5 - 2.5 g/dL 1.8   Hemoglobin A1C Latest Ref Range: 4.80 - 5.60 % 5.80 (H)   WBC Latest Ref Range: 3.50 - 10.80 10*3/mm3 6.19   RBC Latest Ref Range: 3.89 - 5.14 10*6/mm3 4.87   Hemoglobin Latest Ref Range: 11.5 - 15.5 g/dL 14.4   Hematocrit Latest Ref Range: 34.5 - 44.0 % 43.3   RDW Latest Ref Range: 11.3 - 14.5 % 14.0   MCV Latest Ref Range: 80.0 - 99.0 fL 88.9   MCH Latest Ref Range: 27.0 - 31.0 pg 29.6   MCHC Latest Ref Range: 32.0 - 36.0 g/dL 33.3   MPV Latest Ref Range: 6.0 - 12.0 fL 10.8   Platelets Latest Ref Range: 150 - 450 10*3/mm3 314     Assessment and Plan:   Active Problems:    Arthritis of right knee    HTN    HLD (hyperlipidemia)    Prediabetes    Right total knee arthroplasty.    Plan  1. PT/OT- early ambulation post op  2. Pain control-prns, AC nerve block  3. IS-encourage  4. DVT proph- mechs/ASA  5. Bowel regimen  6. Resume home medications as  appropriate  7. Monitor post-op labs  8. DC planning for home    HTN, HLD  - Continue home statin  - Hold dyazide for now  - Monitor BP q4 hrs  - Holding parameters for BP meds  - PRN hydralazine for SBP >170    PreDM  - hgb A1c on 11/2/17 5.8  - Accuchecks ACHS with low dose SSI  - DM educator consult    Seen and examined by me. Agree with above. Discussed with patient. bruna Vann MD  11/13/17  6:14 PM

## 2017-11-14 PROBLEM — Z96.651 S/P TOTAL KNEE ARTHROPLASTY, RIGHT: Status: ACTIVE | Noted: 2017-11-14

## 2017-11-14 LAB
ANION GAP SERPL CALCULATED.3IONS-SCNC: 6 MMOL/L (ref 3–11)
BASOPHILS # BLD AUTO: 0.02 10*3/MM3 (ref 0–0.2)
BASOPHILS NFR BLD AUTO: 0.2 % (ref 0–1)
BUN BLD-MCNC: 11 MG/DL (ref 9–23)
BUN/CREAT SERPL: 12.2 (ref 7–25)
CALCIUM SPEC-SCNC: 8.7 MG/DL (ref 8.7–10.4)
CHLORIDE SERPL-SCNC: 109 MMOL/L (ref 99–109)
CO2 SERPL-SCNC: 25 MMOL/L (ref 20–31)
CREAT BLD-MCNC: 0.9 MG/DL (ref 0.6–1.3)
DEPRECATED RDW RBC AUTO: 48.4 FL (ref 37–54)
EOSINOPHIL # BLD AUTO: 0.04 10*3/MM3 (ref 0–0.3)
EOSINOPHIL NFR BLD AUTO: 0.4 % (ref 0–3)
ERYTHROCYTE [DISTWIDTH] IN BLOOD BY AUTOMATED COUNT: 14.4 % (ref 11.3–14.5)
GFR SERPL CREATININE-BSD FRML MDRD: 64 ML/MIN/1.73
GLUCOSE BLD-MCNC: 104 MG/DL (ref 70–100)
GLUCOSE BLDC GLUCOMTR-MCNC: 111 MG/DL (ref 70–130)
GLUCOSE BLDC GLUCOMTR-MCNC: 115 MG/DL (ref 70–130)
HCT VFR BLD AUTO: 38.6 % (ref 34.5–44)
HGB BLD-MCNC: 12.4 G/DL (ref 11.5–15.5)
IMM GRANULOCYTES # BLD: 0.02 10*3/MM3 (ref 0–0.03)
IMM GRANULOCYTES NFR BLD: 0.2 % (ref 0–0.6)
LYMPHOCYTES # BLD AUTO: 1.93 10*3/MM3 (ref 0.6–4.8)
LYMPHOCYTES NFR BLD AUTO: 20.9 % (ref 24–44)
MCH RBC QN AUTO: 29.2 PG (ref 27–31)
MCHC RBC AUTO-ENTMCNC: 32.1 G/DL (ref 32–36)
MCV RBC AUTO: 91 FL (ref 80–99)
MONOCYTES # BLD AUTO: 0.86 10*3/MM3 (ref 0–1)
MONOCYTES NFR BLD AUTO: 9.3 % (ref 0–12)
NEUTROPHILS # BLD AUTO: 6.37 10*3/MM3 (ref 1.5–8.3)
NEUTROPHILS NFR BLD AUTO: 69 % (ref 41–71)
PLATELET # BLD AUTO: 247 10*3/MM3 (ref 150–450)
PMV BLD AUTO: 11.6 FL (ref 6–12)
POTASSIUM BLD-SCNC: 3.7 MMOL/L (ref 3.5–5.5)
RBC # BLD AUTO: 4.24 10*6/MM3 (ref 3.89–5.14)
SODIUM BLD-SCNC: 140 MMOL/L (ref 132–146)
WBC NRBC COR # BLD: 9.24 10*3/MM3 (ref 3.5–10.8)

## 2017-11-14 PROCEDURE — 80048 BASIC METABOLIC PNL TOTAL CA: CPT | Performed by: NURSE PRACTITIONER

## 2017-11-14 PROCEDURE — 97165 OT EVAL LOW COMPLEX 30 MIN: CPT

## 2017-11-14 PROCEDURE — 97116 GAIT TRAINING THERAPY: CPT

## 2017-11-14 PROCEDURE — 97110 THERAPEUTIC EXERCISES: CPT

## 2017-11-14 PROCEDURE — 97530 THERAPEUTIC ACTIVITIES: CPT

## 2017-11-14 PROCEDURE — 82962 GLUCOSE BLOOD TEST: CPT

## 2017-11-14 PROCEDURE — 25010000002 ROPIVACAINE PER 1 MG: Performed by: NURSE ANESTHETIST, CERTIFIED REGISTERED

## 2017-11-14 PROCEDURE — G0108 DIAB MANAGE TRN  PER INDIV: HCPCS | Performed by: REGISTERED NURSE

## 2017-11-14 PROCEDURE — 85025 COMPLETE CBC W/AUTO DIFF WBC: CPT | Performed by: ORTHOPAEDIC SURGERY

## 2017-11-14 PROCEDURE — 25010000003 CEFAZOLIN IN DEXTROSE 2-4 GM/100ML-% SOLUTION: Performed by: ORTHOPAEDIC SURGERY

## 2017-11-14 RX ORDER — OXYCODONE HYDROCHLORIDE AND ACETAMINOPHEN 5; 325 MG/1; MG/1
TABLET ORAL
Status: DISPENSED
Start: 2017-11-14 | End: 2017-11-15

## 2017-11-14 RX ADMIN — DOCUSATE SODIUM 100 MG: 100 CAPSULE, LIQUID FILLED ORAL at 17:18

## 2017-11-14 RX ADMIN — OXYCODONE AND ACETAMINOPHEN 1 TABLET: 5; 325 TABLET ORAL at 22:34

## 2017-11-14 RX ADMIN — HYDROCODONE BITARTRATE AND ACETAMINOPHEN 1 TABLET: 5; 325 TABLET ORAL at 21:11

## 2017-11-14 RX ADMIN — OXYCODONE AND ACETAMINOPHEN 1 TABLET: 5; 325 TABLET ORAL at 17:18

## 2017-11-14 RX ADMIN — CEFAZOLIN SODIUM 2 G: 2 INJECTION, SOLUTION INTRAVENOUS at 05:18

## 2017-11-14 RX ADMIN — ROPIVACAINE HYDROCHLORIDE 12 ML/HR: 2 INJECTION, SOLUTION EPIDURAL; INFILTRATION at 01:25

## 2017-11-14 RX ADMIN — ROPIVACAINE HYDROCHLORIDE 12 ML/HR: 2 INJECTION, SOLUTION EPIDURAL; INFILTRATION at 17:21

## 2017-11-14 RX ADMIN — ASPIRIN 325 MG: 325 TABLET, DELAYED RELEASE ORAL at 08:37

## 2017-11-14 RX ADMIN — SODIUM CHLORIDE 150 ML/HR: 9 INJECTION, SOLUTION INTRAVENOUS at 07:28

## 2017-11-14 RX ADMIN — ROPIVACAINE HYDROCHLORIDE 12 ML/HR: 2 INJECTION, SOLUTION EPIDURAL; INFILTRATION at 09:13

## 2017-11-14 RX ADMIN — OXYCODONE AND ACETAMINOPHEN 1 TABLET: 5; 325 TABLET ORAL at 12:13

## 2017-11-14 RX ADMIN — OXYCODONE AND ACETAMINOPHEN 1 TABLET: 5; 325 TABLET ORAL at 07:27

## 2017-11-14 RX ADMIN — DOCUSATE SODIUM 100 MG: 100 CAPSULE, LIQUID FILLED ORAL at 08:37

## 2017-11-14 RX ADMIN — ROSUVASTATIN CALCIUM 10 MG: 10 TABLET ORAL at 21:11

## 2017-11-14 NOTE — THERAPY TREATMENT NOTE
Acute Care - Physical Therapy Treatment Note  [facility]     Patient Name: Mel Brady  : 1955  MRN: [id number]  Today's Date: 2017  Onset of Illness/Injury or Date of Surgery Date: 17  Date of Referral to PT: 17  Referring Physician: MD Gus    Admit Date: 2017    Visit Dx:    ICD-10-CM ICD-9-CM   1. Impaired functional mobility, balance, gait, and endurance Z74.09 V49.89     Patient Active Problem List   Diagnosis   • Arthritis of right knee   • HTN   • HLD (hyperlipidemia)   • Prediabetes   • S/P total knee arthroplasty, right               Adult Rehabilitation Note       17 1054          Rehab Assessment/Intervention    Discipline physical therapist  -      Document Type therapy note (daily note)  -MJ      Subjective Information agree to therapy;no complaints  -MJ      Patient Effort, Rehab Treatment good  -MJ      Symptoms Noted During/After Treatment fatigue;increased pain  -MJ      Precautions/Limitations fall precautions;other (see comments)   R adductor nerve catheter  -MJ      Recorded by [MJ] Reagan Wang, PT      Pain Assessment    Pain Assessment 0-10  -MJ      Pain Score 0  -MJ      Post Pain Score 1  -MJ      Pain Type Acute pain  -MJ      Pain Location Knee  -MJ      Pain Orientation Right;Anterior  -MJ      Pain Intervention(s) Repositioned;Ambulation/increased activity;Cold pack  -MJ      Recorded by [MJ] Reagan Wang, ROSALIE      Cognitive Assessment/Intervention    Current Cognitive/Communication Assessment functional  -MJ      Orientation Status oriented x 4  -MJ      Follows Commands/Answers Questions 100% of the time;able to follow multi-step instructions;needs cueing  -MJ      Personal Safety WNL/WFL  -MJ      Recorded by [MJ] Reagan Wang, ROSALIE      Mobility Assessment/Training    Extremity Weight-Bearing Status right lower extremity  -MJ      Right Lower Extremity Weight-Bearing weight-bearing as tolerated  -MJ      Recorded by [MJ] Reagan Wang, PT       Bed Mobility, Assessment/Treatment    Bed Mobility, Assistive Device bed rails;head of bed elevated  -MJ      Bed Mob, Supine to Sit, Quay not tested   Pt UIC  -MJ      Bed Mob, Sit to Supine, Quay supervision required;verbal cues required  -MJ      Bed Mobility, Safety Issues decreased use of legs for bridging/pushing  -MJ      Bed Mobility, Impairments ROM decreased;strength decreased;pain  -MJ      Bed Mobility, Comment Verbal cues for sequencing, increased time and effort to perform  -      Recorded by [MJ] Reagan Wang, PT      Transfer Assessment/Treatment    Transfers, Sit-Stand Quay contact guard assist;verbal cues required  -MJ      Transfers, Stand-Sit [place] contact guard assist;verbal cues required  -MJ      Transfers, Sit-Stand-Sit, Assist Device rolling walker  -MJ      Toilet Transfer, Quay contact guard assist;verbal cues required  -MJ      Toilet Transfer, Assistive Device rolling walker   raised toilet, grab bar  -      Transfer, Safety Issues step length decreased;weight-shifting ability decreased  -MJ      Transfer, Impairments ROM decreased;strength decreased;pain  -      Transfer, Comment Verbal cues for correct hand placement and to step R LE out prior to t/f. Assisted pt to bathroom, independent with hygiene after toileting  -      Recorded by [MJ] Reagan Wang, PT      Gait Assessment/Treatment    Gait, Quay Level contact guard assist;verbal cues required  -      Gait, Assistive Device rolling walker  -      Gait, Distance (Feet) 230  -      Gait, Gait Pattern Analysis swing-through gait  -      Gait, Gait Deviations right:;antalgic;skylar decreased;decreased heel strike;step length decreased;toe-to-floor clearance decreased;weight-shifting ability decreased  -      Gait, Safety Issues sequencing ability decreased;step length decreased;weight-shifting ability decreased  -      Gait, Impairments ROM decreased;strength  decreased;pain  -MJ      Gait, Comment Pt initially demo step to gait pattern, progressed to step through with verbal cues and practice. Cues for upright posture, to stay in middle of RW and ot increase LE weight bearing, mild improvement. Gait limited by pain and fatigue  -MJ      Recorded by [MJ] Reagan Wang, PT      Therapy Exercises    Exercise Protocols total knee  -MJ      Total Knee Exercises right:;15 reps;completed protocol;with assist;ankle pumps/circles;quad set;glut set;heel slide stretch;SLR;SAQ;LAQ   Cues for technique. Michelle w/ initiation of SLR  -MJ      Recorded by [MJ] Reagan Wang, PT      Positioning and Restraints    Pre-Treatment Position sitting in chair/recliner  -MJ      Post Treatment Position bed  -MJ      In Bed notified nsg;supine;call light within reach;encouraged to call for assist;with family/caregiver  -MJ      Recorded by [MJ] Reagan Wang, PT        User Key  (r) = Recorded By, (t) = Taken By, (c) = Cosigned By    Initials Name Effective Dates     Reagan Wang, PT 03/14/16 -                 IP PT Goals       11/14/17 1127 11/13/17 1801       Bed Mobility PT LTG    Bed Mobility PT LTG, Date Established  11/13/17  -LR     Bed Mobility PT LTG, Time to Achieve  3 days  -LR     Bed Mobility PT LTG, Activity Type  supine to sit/sit to supine  -LR     Bed Mobility PT LTG, [place] Level  conditional independence  -LR     Bed Mobility PT LTG, Date Goal Reviewed 11/14/17  - 11/13/17  -LR     Bed Mobility PT LTG, Outcome goal ongoing  - goal ongoing  -LR     Transfer Training PT LTG    Transfer Training PT LTG, Date Established  11/13/17  -LR     Transfer Training PT LTG, Time to Achieve  3 days  -LR     Transfer Training PT LTG, Activity Type  sit to stand/stand to sit  -LR     Transfer Training PT LTG, [place] Level  conditional independence  -LR     Transfer Training PT LTG, Assist Device  walker, rolling  -LR     Transfer Training PT  LTG, Date Goal Reviewed 11/14/17  -  11/13/17  -LR     Transfer Training PT LTG, Outcome goal ongoing  -MJ goal ongoing  -LR     Gait Training PT LTG    Gait Training Goal PT LTG, Date Established  11/13/17  -LR     Gait Training Goal PT LTG, Time to Achieve  3 days  -LR     Gait Training Goal PT LTG, [place] Level  conditional independence  -LR     Gait Training Goal PT LTG, Assist Device  walker, rolling  -LR     Gait Training Goal PT LTG, Distance to Achieve  500 feet  -LR     Gait Training Goal PT LTG, Date Goal Reviewed 11/14/17  -MJ 11/13/17  -LR     Gait Training Goal PT LTG, Outcome goal ongoing  -MJ goal ongoing  -LR     Stair Training PT LTG    Stair Training Goal PT LTG, Date Established  11/13/17  -LR     Stair Training Goal PT LTG, Time to Achieve  3 days  -LR     Stair Training Goal PT LTG, Number of Steps  3  -LR     Stair Training Goal PT LTG, [place] Level  contact guard assist  -LR     Stair Training Goal PT LTG, Assist Device  1 handrail;cane, straight  -LR     Stair Training Goal PT LTG, Date Goal Reviewed 11/14/17  -MJ 11/13/17  -LR     Stair Training Goal PT LTG, Outcome goal ongoing  -MJ goal ongoing  -LR     Range of Motion PT LTG    Range of Motion Goal PT LTG, Date Established  11/13/17  -LR     Range of Motion Goal PT LTG, Time to Achieve  3 days  -LR     Range fo Motion Goal PT LTG, Joint  R knee  -LR     Range of Motion Goal PT LTG, AAROM Measure  0-90 degrees  -LR     Range of Motion Goal PT LTG, Date Goal Reviewed 11/14/17  -MJ 11/13/17  -LR     Range of Motion Goal PT LTG, Outcome goal ongoing  -MJ goal ongoing  -LR       User Key  (r) = Recorded By, (t) = Taken By, (c) = Cosigned By    Initials Name Provider Type    LR Allison Saunders, PT Physical Therapist    GUERLINE Wang, PT Physical Therapist          Physical Therapy Education     Title: PT OT SLP Therapies (Done)     Topic: Physical Therapy (Done)     Point: Mobility training (Done)    Learning Progress Summary    Learner Readiness Method Response  Comment Documented by Status   Patient Acceptance E,D VU,NR Reviewed gait mechanics, HEP  11/14/17 1127 Done    Acceptance TB VU  TP 11/13/17 2105 Done    Acceptance E,D VU,NR Educated on weight bearing status and precautions. LR 11/13/17 1832 Done   Family Acceptance E,D VU,NR Reviewed gait mechanics, [facility] 11/14/17 1127 Done   Mother Acceptance E,D VU,NR Educated on weight bearing status and precautions. LR 11/13/17 1832 Done   Father Acceptance E,D VU,NR Educated on weight bearing status and precautions. LR 11/13/17 1832 Done               Point: Home exercise program (Done)    Learning Progress Summary    Learner Readiness Method Response Comment Documented by Status   Patient Acceptance E,D VU,NR Reviewed gait mechanics, [facility] 11/14/17 1127 Done    Acceptance TB VU   11/13/17 2105 Done    Acceptance E,D VU,NR Educated on weight bearing status and precautions. LR 11/13/17 1832 Done   Family Acceptance E,D VU,NR Reviewed gait mechanics, [facility] 11/14/17 1127 Done   Mother Acceptance E,D VU,NR Educated on weight bearing status and precautions. LR 11/13/17 1832 Done   Father Acceptance E,D VU,NR Educated on weight bearing status and precautions. LR 11/13/17 1832 Done               Point: Body mechanics (Done)    Learning Progress Summary    Learner Readiness Method Response Comment Documented by Status   Patient Acceptance E,D VU,NR Reviewed gait mechanics, [facility] 11/14/17 1127 Done    Acceptance TB VU   11/13/17 2105 Done    Acceptance E,D VU,NR Educated on weight bearing status and precautions. LR 11/13/17 1832 Done   Family Acceptance E,D VU,NR Reviewed gait mechanics, [facility] 11/14/17 1127 Done   Mother Acceptance E,D VU,NR Educated on weight bearing status and precautions. LR 11/13/17 1832 Done   Father Acceptance E,D VU,NR Educated on weight bearing status and precautions. LR 11/13/17 1832 Done               Point: Precautions (Done)    Learning Progress Summary    Learner Readiness Method Response  Comment Documented by Status   Patient Acceptance E,D VU,NR Reviewed gait mechanics, HEP  11/14/17 1127 Done    Acceptance TB VU  TP 11/13/17 2105 Done    Acceptance E,D VU,NR Educated on weight bearing status and precautions.  11/13/17 1832 Done   Family Acceptance E,D KHALIDA,NR Reviewed gait mechanics, HEP  11/14/17 1127 Done   Mother Acceptance E,D VU,NR Educated on weight bearing status and precautions.  11/13/17 1832 Done   Father Acceptance E,D KHALIDA,NR Educated on weight bearing status and precautions.  11/13/17 1832 Done                      User Key     Initials Effective Dates Name Provider Type Discipline     06/19/15 -  Allison Saunders, PT Physical Therapist PT     03/14/16 -  Reagan Wang, PT Physical Therapist PT     07/10/17 -  Laquita Roque RN Registered Nurse Nurse                    PT Recommendation and Plan  Anticipated Equipment Needs At Discharge:  (none)  Anticipated Discharge Disposition: home with assist, home with home health  Planned Therapy Interventions: balance training, bed mobility training, gait training, home exercise program, patient/family education, ROM (Range of Motion), stair training, strengthening, transfer training  PT Frequency: 2 times/day  Plan of Care Review  Plan Of Care Reviewed With: patient  Progress: improving  Outcome Summary/Follow up Plan: Pt increased gait distance to 230 feet with CGA and RW, limited by fatigue. Will obtain ROM measure in PM and continue to progress mobility as able.           Outcome Measures       11/14/17 1054 11/13/17 1801       How much help from another person do you currently need...    Turning from your back to your side while in flat bed without using bedrails? 3  -MJ 3  -LR     Moving from lying on back to sitting on the side of a flat bed without bedrails? 3  -MJ 3  -LR     Moving to and from a bed to a chair (including a wheelchair)? 3  -MJ 3  -LR     Standing up from a chair using your arms (e.g., wheelchair,  bedside chair)? 3  -MJ 3  -LR     Climbing 3-5 steps with a railing? 3  -MJ 1  -LR     To walk in hospital room? 3  -MJ 3  -LR     AM-PAC 6 Clicks Score 18  -MJ 16  -LR     Functional Assessment    Outcome Measure Options AM-PAC 6 Clicks Basic Mobility (PT)  -MJ AM-PAC 6 Clicks Basic Mobility (PT)  -LR       User Key  (r) = Recorded By, (t) = Taken By, (c) = Cosigned By    Initials Name Provider Type    LR Allison Saunders, PT Physical Therapist    GUERLINE Wang PT Physical Therapist           Time Calculation:         PT Charges       11/14/17 1129          Time Calculation    Start Time 1054  -MJ      PT Received On 11/14/17  -      PT Goal Re-Cert Due Date 11/23/17  -      Time Calculation- PT    Total Timed Code Minutes- PT 27 minute(s)  -        User Key  (r) = Recorded By, (t) = Taken By, (c) = Cosigned By    Initials Name Provider Type    GUERLINE Wang, PT Physical Therapist          Therapy Charges for Today     Code Description Service Date Service Provider Modifiers Qty    99715697867 HC GAIT TRAINING EA 15 MIN 11/14/2017 Reagan Wang, PT GP 1    22549497988 HC PT THER PROC EA 15 MIN 11/14/2017 Reagan Wang, PT GP 1          PT G-Codes  Outcome Measure Options: AM-PAC 6 Clicks Basic Mobility (PT)    Reagan Wang PT  11/14/2017

## 2017-11-14 NOTE — PROGRESS NOTES
Discharge Planning Assessment  [facility]     Patient Name: Mel Brady  MRN: [id number]  Today's Date: 11/14/2017    Admit Date: 11/13/2017          Discharge Needs Assessment       11/14/17 1529    Living Environment    Lives With alone    Living Arrangements house    Type of Financial/Environmental Concern none    Transportation Available car;family or friend will provide    Living Environment Comment Patient lives alone in a one story home but her mother, Janina, will be staying with her for several days.      Living Environment    Provides Primary Care For no one    Quality Of Family Relationships supportive;helpful;involved    Able to Return to Prior Living Arrangements yes    Living Arrangement Comments Patient lives alone in a one story home but her mother, Janina, will be staying with her for several days.      Discharge Needs Assessment    Concerns To Be Addressed discharge planning concerns;adjustment to diagnosis/illness concerns    Readmission Within The Last 30 Days no previous admission in last 30 days    Outpatient/Agency/Support Group Needs homecare agency (specify level of care)    Anticipated Changes Related to Illness inability to care for self    Equipment Needed After Discharge none    Discharge Facility/Level Of Care Needs home with home health            Discharge Plan       11/14/17 1531    Case Management/Social Work Plan    Plan Home with mother & [facility]    Additional Comments Spoke with patient at bedside.  Mother was also at bedside.  Patient lives alone in a one story home in [facility].  Her mother will be staying with her for several days.  Patient has been independent with ADLs prior to surgery.  Denies HH.  She has a rolling walker, wheelchair and a cane at home.  She has Rx drug coverage through [employer].  Living will on file.  Plan is home and mother will stay with her to assist her.  Brother will transport patient home.   Patient has chosen [facility] for .   I called referral to Audrey with [facility] and informed her patient is to discharge home on Wednesday.  CM will follow.          Discharge Placement     No information found                Demographic Summary       11/14/17 1528    Referral Information    Admission Type inpatient    Referral Source physician    Reason For Consult discharge planning    Record Reviewed history and physical    Contact Information    Permission Granted to Share Information With     Primary Care Physician Information    Name Chrissy Guerra            Functional Status     None            Psychosocial     None            Abuse/Neglect     None            Legal     None            Substance Abuse     None            Patient Forms     None          Abby Espinoza

## 2017-11-14 NOTE — PLAN OF CARE
Problem: Patient Care Overview (Adult)  Goal: Plan of Care Review  Outcome: Ongoing (interventions implemented as appropriate)    11/14/17 0514   Coping/Psychosocial Response Interventions   Plan Of Care Reviewed With patient   Patient Care Overview   Progress no change   Outcome Evaluation   Outcome Summary/Follow up Plan Patient pain well controlled with PRN PO meds. VSS. Up with assist x1.          Problem: Perioperative Period (Adult)  Goal: Signs and Symptoms of Listed Potential Problems Will be Absent or Manageable (Perioperative Period)  Outcome: Ongoing (interventions implemented as appropriate)    Problem: Knee Replacement, Total (Adult)  Goal: Signs and Symptoms of Listed Potential Problems Will be Absent or Manageable (Knee Replacement, Total)  Outcome: Ongoing (interventions implemented as appropriate)

## 2017-11-14 NOTE — THERAPY EVALUATION
Acute Care - Occupational Therapy Initial Evaluation  [facility]     Patient Name: Mel Brady  : 1955  MRN: [id number]  Today's Date: 2017  Onset of Illness/Injury or Date of Surgery Date: 17  Date of Referral to OT: 17  Referring Physician: MD Gus    Admit Date: 2017       ICD-10-CM ICD-9-CM   1. Impaired functional mobility, balance, gait, and endurance Z74.09 V49.89   2. Impaired mobility and ADLs Z74.09 799.89     Patient Active Problem List   Diagnosis   • Arthritis of right knee   • HTN   • HLD (hyperlipidemia)   • Prediabetes   • S/P total knee arthroplasty, right     Past Medical History:   Diagnosis Date   • Arthritis    • Hypercholesteremia    • Hypertension    • PONV (postoperative nausea and vomiting)    • Wears contact lenses    • Wears glasses      Past Surgical History:   Procedure Laterality Date   • COLONOSCOPY     • KNEE ARTHROSCOPY W/ MEDIAL COLLATERAL LIGAMENT (MCL) REPAIR Left    • MEDIAL COLLATERAL LIGAMENT REPAIR, KNEE Right    • NECK SURGERY     • NC TOTAL KNEE ARTHROPLASTY Right 2017    Procedure: TOTAL KNEE ARTHROPLASTY RIGHT;  Surgeon: Levy Weber MD;  Location: [facility];  Service: Orthopedics   • SHOULDER SURGERY Left           OT ASSESSMENT FLOWSHEET (last 72 hours)      OT Evaluation       17 1054 17 0942 17 2000 17 1801       Rehab Evaluation    Document Type therapy note (daily note)  -MJ evaluation  -HK  evaluation  -LR     Subjective Information agree to therapy;no complaints  -MJ agree to therapy;no complaints  -HK  agree to therapy;complains of;pain  -LR     Patient Effort, Rehab Treatment good  -MJ good  -HK  good  -LR     Symptoms Noted During/After Treatment fatigue;increased pain  -MJ fatigue;increased pain  -HK  fatigue;increased pain  -LR     General Information    Patient Profile Review  yes  -HK  yes  -LR     Onset of Illness/Injury or Date of Surgery Date  17  -HK  17  -LR      Referring Physician  MD Gus  -HK  MD Gus  -LR     General Observations  Pt received [facility] with R adductor nerve canal cath and R knee in ace bandage. Mother at bedside.   -HK  Patient supine in bed upon arrival. IV, R adductor canal nerve catheter, R knee ace bandaged, SCDs. Parents present.   -LR     Pertinent History Of Current Problem  Pt is a 62 YOF who presents for surgical management of intractable knee pain that failed to improve with conservative measures. Pt is POD #1 s/p R TKA.  -HK  Patient presents for surgical management of persistent and progressive R hip pain and dysfunction that has failed to improve with conservative management.   -LR     Precautions/Limitations fall precautions;other (see comments)   R adductor nerve catheter  -MJ fall precautions;other (see comments)   R adductor nerve canal cath  -HK  fall precautions;other (see comments)   R adductor canal nerve catheter.  -LR     Prior Level of Function  min assist:;all household mobility;community mobility;gait;transfer;bed mobility;home management;cooking;cleaning;shopping;using stairs;ADL's  -HK  min assist:;all household mobility;community mobility;gait;transfer;bed mobility;home management;cooking;cleaning;shopping;using stairs;independent:;driving   all mobility limited by pain  -LR     Equipment Currently Used at Home  bath bench;raised toilet;walker, rolling;cane, straight  -HK  bath bench;raised toilet;walker, rolling;cane, straight   not currently using  -LR     Plans/Goals Discussed With  patient and family;agreed upon  -HK  patient and family;agreed upon  -LR     Risks Reviewed  patient and family:;LOB;nausea/vomiting;dizziness;increased discomfort;change in vital signs;increased drainage;lines disloged  -HK  patient and family:;LOB;nausea/vomiting;dizziness;increased discomfort;lines disloged  -LR     Benefits Reviewed  patient and family:;increase independence;improve function;increase strength;increase balance;decrease  pain;decrease risk of DVT;improve skin integrity;increase knowledge  -HK  patient and family:;improve function;increase independence;increase strength;increase balance;decrease pain;increase knowledge  -LR     Barriers to Rehab  none identified  -HK  previous functional deficit  -LR     Living Environment    Lives With  alone  -HK  alone  -LR     Living Arrangements  house  -HK  house  -LR     Home Accessibility  bed and bath on same level;house is not wheelchair accessible;stairs to enter home;tub/shower is not walk in  -HK  bed and bath on same level;house is not wheelchair accessible;stairs to enter home;tub/shower is not walk in  -LR     Number of Stairs to Enter Home  3  -HK  3  -LR     Stair Railings at Home  outside, present on left side  -HK  outside, present on left side  -LR     Type of Financial/Environmental Concern    none  -LR     Transportation Available    family or friend will provide  -LR     Living Environment Comment    Patient's parents will be available to assist her at all times upon d/c home.   -LR     Clinical Impression    Date of Referral to OT  11/13/17  -HK       OT Diagnosis  Decreased independence with ADLs and Mobility.  -HK       Patient/Family Goals Statement  Pt would like to have pain managed and return home.   -HK       Criteria for Skilled Therapeutic Interventions Met  yes;treatment indicated  -HK       Rehab Potential  good, to achieve stated therapy goals  -HK       Therapy Frequency  daily  -HK       Anticipated Equipment Needs At Discharge  --   to be assessed  -HK       Anticipated Discharge Disposition  home with assist;home with home health  -HK       Functional Level Prior    Ambulation   0-->independent  -TP      Transferring   0-->independent  -TP      Toileting   0-->independent  -TP      Bathing   0-->independent  -TP      Dressing   0-->independent  -TP      Eating   0-->independent  -TP      Communication   0-->understands/communicates without difficulty  -TP       Swallowing   0-->swallows foods/liquids without difficulty  -TP      Prior Functional Level Comment   No functional issue  -TP      Vital Signs    Pre Patient Position  Sitting  -HK       Intra Patient Position  Standing  -HK       Post Patient Position  Sitting  -HK       Pain Assessment    Pain Assessment 0-10  -MJ No/denies pain  -HK  0-10  -LR     Pain Score 0  -MJ   4  -LR     Post Pain Score 1  -MJ   5  -LR     Pain Type Acute pain  -MJ   Acute pain  -LR     Pain Location Knee  -MJ   Knee  -LR     Pain Orientation Right;Anterior  -MJ   Right;Anterior  -LR     Pain Intervention(s) Repositioned;Ambulation/increased activity;Cold pack  -MJ   Repositioned;Ambulation/increased activity  -LR     Vision Assessment/Intervention    Visual Impairment  WFL  -HK  WFL with corrective lenses  -LR     Cognitive Assessment/Intervention    Current Cognitive/Communication Assessment functional  -MJ functional  -HK  functional  -LR     Orientation Status oriented x 4  -MJ oriented x 4  -HK  oriented x 4;required verbal cueing (specifiy in comments)  -LR     Follows Commands/Answers Questions 100% of the time;able to follow multi-step instructions;needs cueing  -% of the time;able to follow single-step instructions  -HK  100% of the time;able to follow single-step instructions;needs cueing;needs repetition  -LR     Personal Safety WNL/WFL  -MJ WNL/WFL  -HK  WNL/WFL  -LR     ROM (Range of Motion)    General ROM  --   not formally assessed; WFL  -HK  lower extremity range of motion deficits identified  -LR     MMT (Manual Muscle Testing)    General MMT Assessment  --   not formally assessed; WFL  -HK  lower extremity strength deficits identified  -LR     Mobility Assessment/Training    Extremity Weight-Bearing Status right lower extremity  -MJ   right lower extremity  -LR     Right Lower Extremity Weight-Bearing weight-bearing as tolerated  -MJ   weight-bearing as tolerated  -LR     Bed Mobility, Assessment/Treatment    Bed  Mobility, Assistive Device bed rails;head of bed elevated  -MJ   head of bed elevated;bed rails  -LR     Bed Mob, Supine to Sit, [place] not tested   Pt UIC  -MJ   verbal cues required;minimum assist (75% patient effort)  -LR     Bed Mob, Sit to Supine, [place] supervision required;verbal cues required  -MJ   not tested   UIC at end of eval.   -LR     Bed Mobility, Safety Issues decreased use of legs for bridging/pushing  -MJ   decreased use of legs for bridging/pushing  -LR     Bed Mobility, Impairments ROM decreased;strength decreased;pain  -MJ   ROM decreased;strength decreased;pain  -LR     Bed Mobility, Comment Verbal cues for sequencing, increased time and effort to perform  -MJ Pt received UI. OT issued and educated pt on use of leg  for bed mobility, car transfers, and tub transfers.   -HK  Verbal cues to move LEs towards EOB and to push up from bed from behind to raise trunk into sitting and to scoot hips out to get feet on floor. Min assist required to move R LE. Denied dizziness upon sitting up.   -LR     Transfer Assessment/Treatment    Transfers, Sit-Stand [place] contact guard assist;verbal cues required  -MJ contact guard assist;verbal cues required  -HK  verbal cues required;contact guard assist;2 person assist required  -LR     Transfers, Stand-Sit [place] contact guard assist;verbal cues required  -MJ contact guard assist;verbal cues required  -HK  verbal cues required;contact guard assist;2 person assist required  -LR     Transfers, Sit-Stand-Sit, Assist Device rolling walker  -MJ rolling walker  -HK  rolling walker;elevated surface  -LR     Toilet Transfer, [place] contact guard assist;verbal cues required  -MJ        Toilet Transfer, Assistive Device rolling walker   raised toilet, grab bar  -MJ        Transfer, Safety Issues step length decreased;weight-shifting ability decreased  -MJ balance decreased during turns;step length decreased;weight-shifting ability  decreased  -HK  sequencing ability decreased;step length decreased;weight-shifting ability decreased  -LR     Transfer, Impairments ROM decreased;strength decreased;pain  -MJ ROM decreased;strength decreased;pain  -HK  ROM decreased;strength decreased;pain  -LR     Transfer, Comment Verbal cues for correct hand placement and to step R LE out prior to t/f. Assisted pt to bathroom, independent with hygiene after toileting  -MJ Verbal cues to push from chair and reach for chair when sitting. OT educated pt on knee precautions and how to maintain during transfers.   -HK  Verbal cues to push up from bed to stand and to reach back for chair to lower into sitting. Verbal cues to step R LE out before t/f for comfort.   -LR     Functional Mobility    Functional Mobility- Ind. Level  contact guard assist;verbal cues required  -HK       Functional Mobility- Device  rolling walker  -HK       Functional Mobility-Distance (Feet)  25  -HK       Functional Mobility- Safety Issues  step length decreased;weight-shifting ability decreased  -HK       Functional Mobility- Comment  Pt ambulated in room and back to chair.   -HK       Lower Body Bathing Assessment/Training    LB Bathing Assess/Train, Comment  OT educated pt on use of LH sponge for LB bathing. Pt states she already has one at home. OT educated and demonstrated tub transfer using tub bench.    -HK       Lower Body Dressing Assessment/Training    LB Dressing Assess/Train, Clothing Type  doffing:;donning:;slipper socks  -HK       LB Dressing Assess/Train, Assist Device  reacher;sock-aid  -HK       LB Dressing Assess/Train, Position  sitting  -HK       LB Dressing Assess/Train, [place]  supervision required;verbal cues required  -HK       LB Dressing Assess/Train, Impairments  ROM decreased;strength decreased;pain  -HK       LB Dressing Assess/Train, Comment  OT educated pt on use of AE for LBD. Pt states she has all AE she needs at home.   -HK       Therapy Exercises     Exercise Protocols total knee  -MJ   total knee  -LR     Total Knee Exercises right:;15 reps;completed protocol;with assist;ankle pumps/circles;quad set;glut set;heel slide stretch;SLR;SAQ;LAQ   Cues for technique. Michelle w/ initiation of SLR  -MJ   right:;10 reps;ankle pumps/circles;quad set;glut set   cues for technique  -LR     Sensory Assessment/Intervention    Sensory Impairment    --   denies numbness/tingling;able to actively DF bilaterally  -LR     General Therapy Interventions    Planned Therapy Interventions  adaptive equipment training;ADL retraining;bed mobility training;transfer training  -HK       Adaptive Equipment Training  OT educated pt on use of AE  -HK       ADL Retraining  OT educated pt on use of AE for LBD and LB bathing  -HK       Bed Mobility Training  OT educated pt on use of leg  for bed mobility.   -HK       Transfer Training  OT educated pt on completing transfers while maintaining knee precautions.   -HK       Positioning and Restraints    Pre-Treatment Position sitting in chair/recliner  -MJ sitting in chair/recliner  -HK  in bed  -LR     Post Treatment Position bed  -MJ chair  -HK  chair  -LR     In Bed notified nsg;supine;call light within reach;encouraged to call for assist;with family/caregiver  -MJ        In Chair  reclined;call light within reach;encouraged to call for assist;exit alarm on;with family/caregiver  -HK  notified nsg;reclined;sitting;call light within reach;encouraged to call for assist;exit alarm on;with family/caregiver;compression device;legs elevated  -LR     General LE Assessment    ROM    LLE ROM was WFL;knee, right: LE ROM deficit  -LR     ROM Detail    R knee AROM impaired 25%  -LR     Lower Extremity    Lower Ext Manual Muscle Testing    left LE strength is WFL;right knee strength deficit  -LR     Lower Ext Manual Muscle Testing Detail    R knee functionally 4-/5  -LR       User Key  (r) = Recorded By, (t) = Taken By, (c) = Cosigned By    Initials Name  Effective Dates    LR Allison Saunders, PT 06/19/15 -     GUERLINE Wang, PT 03/14/16 -     TP Laquita Roque, RN 07/10/17 -      Opal Vizcarra OT 09/28/17 -            Occupational Therapy Education     Title: PT OT SLP Therapies (Done)     Topic: Occupational Therapy (Done)     Point: ADL training (Done)    Description: Instruct learner(s) on proper safety adaptation and remediation techniques during self care or transfers.   Instruct in proper use of assistive devices.    Learning Progress Summary    Learner Readiness Method Response Comment Documented by Status   Patient Acceptance E VU OT educated pt on ADL retraining with transfers and AE, safety precautions, and appropriate body mechanics.  11/14/17 1328 Done               Point: Precautions (Done)    Description: Instruct learner(s) on prescribed precautions during self-care and functional transfers.    Learning Progress Summary    Learner Readiness Method Response Comment Documented by Status   Patient Acceptance E VU OT educated pt on ADL retraining with transfers and AE, safety precautions, and appropriate body mechanics.  11/14/17 1328 Done               Point: Body mechanics (Done)    Description: Instruct learner(s) on proper positioning and spine alignment during self-care, functional mobility activities and/or exercises.    Learning Progress Summary    Learner Readiness Method Response Comment Documented by Status   Patient Acceptance E VU OT educated pt on ADL retraining with transfers and AE, safety precautions, and appropriate body mechanics.  11/14/17 1328 Done                      User Key     Initials Effective Dates Name Provider Type Discipline     09/28/17 -  Opal Vizcarra, OT Occupational Therapist OT                  OT Recommendation and Plan  Anticipated Equipment Needs At Discharge:  (to be assessed)  Anticipated Discharge Disposition: home with assist, home with home health  Planned Therapy Interventions: adaptive  equipment training, ADL retraining, bed mobility training, transfer training  Therapy Frequency: daily  Plan of Care Review  Plan Of Care Reviewed With: patient  Progress: improving  Outcome Summary/Follow up Plan: Pt completed transfers with CGA. OT educated pt on use of AE for LBD and LBB. Pt states she does not need to purchase she has AE at home. Recommend discharge home with assist and home health.           OT Goals       11/14/17 1329          Bed Mobility OT STG    Bed Mobility OT STG, Date Established 11/14/17  -HK      Bed Mobility OT STG, Time to Achieve by discharge  -HK      Bed Mobility OT STG, Activity Type scoot/bridge;supine to sit/sit to supine  -HK      Bed Mobility OT STG, [place] Level supervision required;minimum assist (75% patient effort);verbal cues required  -HK      Bed Mobility OT STG, Outcome goal ongoing  -HK      Transfer Training OT LTG    Transfer Training OT LTG, Date Established 11/14/17  -HK      Transfer Training OT LTG, Time to Achieve by discharge  -HK      Transfer Training OT LTG, Activity Type bed to chair /chair to bed;sit to stand/stand to sit;toilet  -HK      Transfer Training OT LTG, [place] Level contact guard assist;verbal cues required  -HK      Transfer Training OT LTG, Outcome goal ongoing  -HK      Patient Education OT LTG    Patient Education OT LTG, Date Established 11/14/17  -HK      Patient Education OT LTG, Time to Achieve by discharge  -HK      Patient Education OT LTG, Education Type precautions per surgeon;positioning;posture/body mechanics;1 hand/yousuf technique;adaptive equipment mgmt  -HK      Patient Education OT LTG, Education Understanding demonstrates adequately  -HK      Patient Education OT LTG Outcome goal ongoing  -HK      LB Dressing OT LTG    LB Dressing Goal OT LTG, Date Established 11/14/17  -HK      LB Dressing Goal OT LTG, Time to Achieve by discharge  -HK      LB Dressing Goal OT LTG, Activity Type don/doff socks  -HK      LB  Dressing Goal OT LTG, [place] Level supervision required;verbal cues required  -HK      LB Dressing Goal OT LTG, Adaptive Equipment reacher;sock-aid  -HK      LB Dressing Goal OT LTG, Outcome goal met  -HK        User Key  (r) = Recorded By, (t) = Taken By, (c) = Cosigned By    Initials Name Provider Type    HK Opal Vizcarra, OT Occupational Therapist                Outcome Measures       11/14/17 1054 11/14/17 0942 11/13/17 1801    How much help from another person do you currently need...    Turning from your back to your side while in flat bed without using bedrails? 3  -MJ  3  -LR    Moving from lying on back to sitting on the side of a flat bed without bedrails? 3  -MJ  3  -LR    Moving to and from a bed to a chair (including a wheelchair)? 3  -MJ  3  -LR    Standing up from a chair using your arms (e.g., wheelchair, bedside chair)? 3  -MJ  3  -LR    Climbing 3-5 steps with a railing? 3  -MJ  1  -LR    To walk in hospital room? 3  -MJ  3  -LR    AM-PAC 6 Clicks Score 18  -MJ  16  -LR    How much help from another is currently needed...    Putting on and taking off regular lower body clothing?  3  -HK     Bathing (including washing, rinsing, and drying)  3  -HK     Toileting (which includes using toilet bed pan or urinal)  3  -HK     Putting on and taking off regular upper body clothing  4  -HK     Taking care of personal grooming (such as brushing teeth)  4  -HK     Eating meals  4  -HK     Score  21  -HK     Functional Assessment    Outcome Measure Options AM-PAC 6 Clicks Basic Mobility (PT)  -MJ AM-PAC 6 Clicks Daily Activity (OT)  -HK AM-PAC 6 Clicks Basic Mobility (PT)  -LR      User Key  (r) = Recorded By, (t) = Taken By, (c) = Cosigned By    Initials Name Provider Type    LR Allison Saunders, PT Physical Therapist    GUERLINE Wang, PT Physical Therapist    HK Opal Vizcarra, OT Occupational Therapist          Time Calculation:   OT Start Time: 0942    Therapy Charges for Today     Code Description  Service Date Service Provider Modifiers Qty    98660428764 HC OT EVAL LOW COMPLEXITY 2 11/14/2017 Opal Vizcarra OT GO 1    64246462834 HC OT THERAPEUTIC ACT EA 15 MIN 11/14/2017 Opal Vizcarra OT GO 2               Opal Vizcarra OT  11/14/2017

## 2017-11-14 NOTE — PLAN OF CARE
Problem: Patient Care Overview (Adult)  Goal: Plan of Care Review  Outcome: Ongoing (interventions implemented as appropriate)    11/14/17 1757   Coping/Psychosocial Response Interventions   Plan Of Care Reviewed With patient   Patient Care Overview   Progress improving   Outcome Evaluation   Outcome Summary/Follow up Plan Pt increased gait distance to 460 feet with CGA and RW and progressed to stair training this date. Pt's ROM measured 19-80 degrees. Pt anticipates discharge home tomorrow, will continue to progress mobility as able.          Problem: Inpatient Physical Therapy  Goal: Bed Mobility Goal LTG- PT  Outcome: Ongoing (interventions implemented as appropriate)    11/13/17 1801 11/14/17 1757   Bed Mobility PT LTG   Bed Mobility PT LTG, Date Established 11/13/17 --    Bed Mobility PT LTG, Time to Achieve 3 days --    Bed Mobility PT LTG, Activity Type supine to sit/sit to supine --    Bed Mobility PT LTG, [place] Level conditional independence --    Bed Mobility PT LTG, Date Goal Reviewed --  11/14/17   Bed Mobility PT LTG, Outcome --  goal ongoing       Goal: Transfer Training Goal 1 LTG- PT  Outcome: Ongoing (interventions implemented as appropriate)    11/13/17 1801 11/14/17 1757   Transfer Training PT LTG   Transfer Training PT LTG, Date Established 11/13/17 --    Transfer Training PT LTG, Time to Achieve 3 days --    Transfer Training PT LTG, Activity Type sit to stand/stand to sit --    Transfer Training PT LTG, [place] Level conditional independence --    Transfer Training PT LTG, Assist Device walker, rolling --    Transfer Training PT LTG, Date Goal Reviewed --  11/14/17   Transfer Training PT LTG, Outcome --  goal ongoing       Goal: Gait Training Goal LTG- PT  Outcome: Ongoing (interventions implemented as appropriate)    11/13/17 1801 11/14/17 1757   Gait Training PT LTG   Gait Training Goal PT LTG, Date Established 11/13/17 --    Gait Training Goal PT LTG, Time to Achieve 3 days --     Gait Training Goal PT LTG, [place] Level conditional independence --    Gait Training Goal PT LTG, Assist Device walker, rolling --    Gait Training Goal PT LTG, Distance to Achieve 500 feet --    Gait Training Goal PT LTG, Date Goal Reviewed --  11/14/17   Gait Training Goal PT LTG, Outcome --  goal ongoing       Goal: Stair Training Goal LTG- PT  Outcome: Outcome(s) achieved Date Met:  11/14/17 11/13/17 1801 11/14/17 1757   Stair Training PT LTG   Stair Training Goal PT LTG, Date Established 11/13/17 --    Stair Training Goal PT LTG, Time to Achieve 3 days --    Stair Training Goal PT LTG, Number of Steps 3 --    Stair Training Goal PT LTG, [place] Level contact guard assist --    Stair Training Goal PT LTG, Assist Device 1 handrail;cane, straight --    Stair Training Goal PT LTG, Date Goal Reviewed --  11/14/17   Stair Training Goal PT LTG, Outcome --  goal met       Goal: Range of Motion Goal LTG- PT  Outcome: Ongoing (interventions implemented as appropriate)    11/13/17 1801 11/14/17 1757   Range of Motion PT LTG   Range of Motion Goal PT LTG, Date Established 11/13/17 --    Range of Motion Goal PT LTG, Time to Achieve 3 days --    Range fo Motion Goal PT LTG, Joint R knee --    Range of Motion Goal PT LTG, AAROM Measure 0-90 degrees --    Range of Motion Goal PT LTG, Date Goal Reviewed --  11/14/17   Range of Motion Goal PT LTG, Outcome --  goal ongoing         Problem: Knee Replacement, Total (Adult)  Goal: Signs and Symptoms of Listed Potential Problems Will be Absent or Manageable (Knee Replacement, Total)  Outcome: Ongoing (interventions implemented as appropriate)    11/14/17 1757   Knee Replacement, Total   Problems Assessed (Total Knee Replacement) pain;decreased range of motion;functional decline/self care deficit   Problems Present (Total Knee Replacement) pain;decreased range of motion;functional decline/self care deficit

## 2017-11-14 NOTE — PROGRESS NOTES
"Mel Brady  [id number]  1955    /[birth year]  Pulse 68  Temp 99.1 °F (37.3 °C) (Oral)   Resp 16  Ht 62\" (157.5 cm)  Wt 260 lb (118 kg)  SpO2 98%  BMI 47.55 kg/m2    Lab Results (last 24 hours)     Procedure Component Value Units Date/Time    OR Potassium [[id number]]  (Normal) Collected:  11/13/17 0941    Specimen:  Blood Updated:  11/13/17 1007     Potassium, OR 3.59 mmol/L     POC Glucose Fingerstick [[id number]]  (Abnormal) Collected:  11/13/17 2102    Specimen:  Blood Updated:  11/13/17 2130     Glucose 135 (H) mg/dL     Narrative:       Meter: [id number] : [id number] Darryl Yin    CBC & Differential [[id number]] Collected:  11/14/17 0438    Specimen:  Blood Updated:  11/14/17 0625    Narrative:       The following orders were created for panel order CBC & Differential.  Procedure                               Abnormality         Status                     ---------                               -----------         ------                     CBC Auto Differential[[id number]]        Abnormal            Final result                 Please view results for these tests on the individual orders.    CBC Auto Differential [[id number]]  (Abnormal) Collected:  11/14/17 0438    Specimen:  Blood Updated:  11/14/17 0625     WBC 9.24 10*3/mm3      RBC 4.24 10*6/mm3      Hemoglobin 12.4 g/dL      Hematocrit 38.6 %      MCV 91.0 fL      MCH 29.2 pg      MCHC 32.1 g/dL      RDW 14.4 %      RDW-SD 48.4 fl      MPV 11.6 fL      Platelets 247 10*3/mm3      Neutrophil % 69.0 %      Lymphocyte % 20.9 (L) %      Monocyte % 9.3 %      Eosinophil % 0.4 %      Basophil % 0.2 %      Immature Grans % 0.2 %      Neutrophils, Absolute 6.37 10*3/mm3      Lymphocytes, Absolute 1.93 10*3/mm3      Monocytes, Absolute 0.86 10*3/mm3      Eosinophils, Absolute 0.04 10*3/mm3      Basophils, Absolute 0.02 10*3/mm3      Immature Grans, Absolute 0.02 10*3/mm3     Basic Metabolic Panel [[id number]]  (Abnormal) Collected:  11/14/17 0438    " Specimen:  Blood Updated:  11/14/17 0639     Glucose 104 (H) mg/dL      BUN 11 mg/dL      Creatinine 0.90 mg/dL      Sodium 140 mmol/L      Potassium 3.7 mmol/L      Chloride 109 mmol/L      CO2 25.0 mmol/L      Calcium 8.7 mg/dL      eGFR Non African Amer 64 mL/min/1.73      BUN/Creatinine Ratio 12.2     Anion Gap 6.0 mmol/L     Narrative:       [employer] Guidelines    Stage     Description        GFR  1         Normal or High     90+  2         Mild decrease      60-89  3         Moderate decrease  30-59  4         Severe decrease    15-29  5         Kidney failure     <15    POC Glucose Fingerstick [[id number]]  (Normal) Collected:  11/14/17 0643    Specimen:  Blood Updated:  11/14/17 0643     Glucose 111 mg/dL     Narrative:       Meter: [id number] : [id number] Mikael Marcum          Patient Care Team:  Chrissy Guerra MD as PCP - General  Chrissy Guerra MD as PCP - Family Medicine    SUBJECTIVE  Pain well controlled.  Good start in physical therapy.    PHYSICAL EXAM  Incision benign  Minimal thigh swelling  Neurovascularly intact to knees and toes    Principal Problem:    S/P total knee arthroplasty, right  Active Problems:    Arthritis of right knee    HTN    HLD (hyperlipidemia)    Prediabetes      PLAN / DISPOSITION:  Hemoglobin stable no transfusion anticipated  Discharge home today or tomorrow depending on progress with pain control    Levy Weber MD  11/14/17  8:57 AM

## 2017-11-14 NOTE — PLAN OF CARE
Problem: Patient Care Overview (Adult)  Goal: Plan of Care Review  Outcome: Ongoing (interventions implemented as appropriate)    11/13/17 2102   Coping/Psychosocial Response Interventions   Plan Of Care Reviewed With patient   Patient Care Overview   Progress improving   Outcome Evaluation   Outcome Summary/Follow up Plan Pt pain improved. Will continue to monitor

## 2017-11-14 NOTE — PLAN OF CARE
Problem: Patient Care Overview (Adult)  Goal: Plan of Care Review  Outcome: Ongoing (interventions implemented as appropriate)    11/14/17 1127   Coping/Psychosocial Response Interventions   Plan Of Care Reviewed With patient   Patient Care Overview   Progress improving   Outcome Evaluation   Outcome Summary/Follow up Plan Pt increased gait distance to 230 feet with CGA and RW, limited by fatigue. Will obtain ROM measure in PM and continue to progress mobility as able.          Problem: Inpatient Physical Therapy  Goal: Bed Mobility Goal LTG- PT  Outcome: Ongoing (interventions implemented as appropriate)    11/13/17 1801 11/14/17 1127   Bed Mobility PT LTG   Bed Mobility PT LTG, Date Established 11/13/17 --    Bed Mobility PT LTG, Time to Achieve 3 days --    Bed Mobility PT LTG, Activity Type supine to sit/sit to supine --    Bed Mobility PT LTG, [place] Level conditional independence --    Bed Mobility PT LTG, Date Goal Reviewed --  11/14/17   Bed Mobility PT LTG, Outcome --  goal ongoing       Goal: Transfer Training Goal 1 LTG- PT  Outcome: Ongoing (interventions implemented as appropriate)    11/13/17 1801 11/14/17 1127   Transfer Training PT LTG   Transfer Training PT LTG, Date Established 11/13/17 --    Transfer Training PT LTG, Time to Achieve 3 days --    Transfer Training PT LTG, Activity Type sit to stand/stand to sit --    Transfer Training PT LTG, [place] Level conditional independence --    Transfer Training PT LTG, Assist Device walker, rolling --    Transfer Training PT LTG, Date Goal Reviewed --  11/14/17   Transfer Training PT LTG, Outcome --  goal ongoing       Goal: Gait Training Goal LTG- PT  Outcome: Ongoing (interventions implemented as appropriate)    11/13/17 1801 11/14/17 1127   Gait Training PT LTG   Gait Training Goal PT LTG, Date Established 11/13/17 --    Gait Training Goal PT LTG, Time to Achieve 3 days --    Gait Training Goal PT LTG, [place] Level conditional  independence --    Gait Training Goal PT LTG, Assist Device walker, rolling --    Gait Training Goal PT LTG, Distance to Achieve 500 feet --    Gait Training Goal PT LTG, Date Goal Reviewed --  11/14/17   Gait Training Goal PT LTG, Outcome --  goal ongoing       Goal: Stair Training Goal LTG- PT  Outcome: Ongoing (interventions implemented as appropriate)    11/13/17 1801 11/14/17 1127   Stair Training PT LTG   Stair Training Goal PT LTG, Date Established 11/13/17 --    Stair Training Goal PT LTG, Time to Achieve 3 days --    Stair Training Goal PT LTG, Number of Steps 3 --    Stair Training Goal PT LTG, [place] Level contact guard assist --    Stair Training Goal PT LTG, Assist Device 1 handrail;cane, straight --    Stair Training Goal PT LTG, Date Goal Reviewed --  11/14/17   Stair Training Goal PT LTG, Outcome --  goal ongoing       Goal: Range of Motion Goal LTG- PT  Outcome: Ongoing (interventions implemented as appropriate)    11/13/17 1801 11/14/17 1127   Range of Motion PT LTG   Range of Motion Goal PT LTG, Date Established 11/13/17 --    Range of Motion Goal PT LTG, Time to Achieve 3 days --    Range fo Motion Goal PT LTG, Joint R knee --    Range of Motion Goal PT LTG, AAROM Measure 0-90 degrees --    Range of Motion Goal PT LTG, Date Goal Reviewed --  11/14/17   Range of Motion Goal PT LTG, Outcome --  goal ongoing         Problem: Knee Replacement, Total (Adult)  Goal: Signs and Symptoms of Listed Potential Problems Will be Absent or Manageable (Knee Replacement, Total)  Outcome: Ongoing (interventions implemented as appropriate)    11/14/17 1127   Knee Replacement, Total   Problems Assessed (Total Knee Replacement) pain;decreased range of motion;functional decline/self care deficit   Problems Present (Total Knee Replacement) pain;decreased range of motion;functional decline/self care deficit

## 2017-11-14 NOTE — CONSULTS
"Diabetes Education  Assessment/Teaching    Patient Name:  Mel Brady  YOB: 1955  MRN: [id number]  Admit Date:  11/13/2017      Assessment Date:  11/14/2017       Most Recent Value    General Information      Referral From:  MD Xavier order    Height  5' 2\" (1.575 m)    Height Method  Stated    Weight  260 lb (118 kg)    Weight Method  Stated    Pregnancy Assessment     Diabetes History     What type of diabetes do you have?  Pre-diabetes    Length of Diabetes Diagnosis  Newly diagnosed <6 months [5.8%]    Current DM knowledge  fair    Have you had diabetes education/teaching in the past?  no    Do you test your blood sugar at home?  no    Education Preferences     What areas of diabetes would you like to learn about?  -- [is aware of diabetes prevention programs]    Nutrition Information     Assessment Topics     Healthy Eating - Assessment  Needs education    Being Active - Assessment  Needs education [she plans to carryout PT]    Taking Medication - Assessment  N/A-unable to assess    Problem Solving - Assessment  Needs education    Reducing Risk - Assessment  Needs education    Healthy Coping - Assessment  Competent    Monitoring - Assessment  Needs education    DM Goals     Problem Solving - Goal  0-30 days from discharge [f/u with PCP]               Most Recent Value    DM Education Needs     Meter  Meter provided    Meter Type  One Touch    Frequency of Testing  Daily    Blood Glucose Target  -- [discussed and provided ADA diagnostic criteria.]    Problem Solving  Hypoglycemia, Hyperglycemia, Sick days, Signs, Symptoms, Treatment    Reducing Risks  A1C testing    Physical Activity  Other (comment) [plans to do PT]    Healthy Coping  Appropriate    Discharge Plan  Home    Motivation  Strong    Teaching Method  Explanation, Discussion, Demonstration, Handouts, Teach back    Patient Response  Verbalized understanding, Demonstrates adequately          Patient educated on Pre-diabetes, diabetes " and the disease process, types of DM, diagnosis/A1C, monitoring, signs and symptoms, activity and exercise and how to prevent disease from progressing. Changing behavior and goal setting relative to diet, exercise and healthy lifestyle was emphasized. Patient provided a new donated [employer] Verio meter and taught how to use it. Pt. was also advised to contact PCP for prescription for lancets and strips. Pt. verbalized understanding and also completed a return demonstration on using the meter using Teach Back method. Pt advised to consult with PCP for further instructions, discuss A1C result, and POC. Education handouts provided, questions answered and pt. advised to call with any other questions or concerns.  Other Comments:       Electronically signed by:  Angelita Condon RN  11/14/17 3:55 PM

## 2017-11-14 NOTE — THERAPY TREATMENT NOTE
Acute Care - Physical Therapy Treatment Note  [facility]     Patient Name: Mel Brady  : 1955  MRN: [id number]  Today's Date: 2017  Onset of Illness/Injury or Date of Surgery Date: 17  Date of Referral to PT: 17  Referring Physician: MD Gus    Admit Date: 2017    Visit Dx:    ICD-10-CM ICD-9-CM   1. Impaired functional mobility, balance, gait, and endurance Z74.09 V49.89   2. Impaired mobility and ADLs Z74.09 799.89     Patient Active Problem List   Diagnosis   • Arthritis of right knee   • HTN   • HLD (hyperlipidemia)   • Prediabetes   • S/P total knee arthroplasty, right               Adult Rehabilitation Note       17 1614 17 1054       Rehab Assessment/Intervention    Discipline physical therapist  -MJ physical therapist  -MJ     Document Type therapy note (daily note)  -MJ therapy note (daily note)  -MJ     Subjective Information agree to therapy;complains of;pain  -MJ agree to therapy;no complaints  -MJ     Patient Effort, Rehab Treatment excellent  -MJ good  -MJ     Symptoms Noted During/After Treatment fatigue  -MJ fatigue;increased pain  -MJ     Precautions/Limitations fall precautions;other (see comments)   R adductor nerve catheter  -MJ fall precautions;other (see comments)   R adductor nerve catheter  -MJ     Recorded by [MJ] Reagan Wang, PT [MJ] Reagan Wang, PT     Pain Assessment    Pain Assessment 0-10  -MJ 0-10  -MJ     Pain Score 5  -MJ 0  -MJ     Post Pain Score 3  -MJ 1  -MJ     Pain Type Acute pain  -MJ Acute pain  -MJ     Pain Location Knee  -MJ Knee  -MJ     Pain Orientation Right;Anterior;Posterior  -MJ Right;Anterior  -MJ     Pain Intervention(s) Repositioned;Ambulation/increased activity;Cold pack  -MJ Repositioned;Ambulation/increased activity;Cold pack  -MJ     Recorded by [MJ] Reagan Wang, PT [MJ] Reagan Wang PT     Cognitive Assessment/Intervention    Current Cognitive/Communication Assessment functional  -MJ functional  -MJ      Orientation Status oriented x 4  -MJ oriented x 4  -MJ     Follows Commands/Answers Questions 100% of the time;able to follow multi-step instructions;needs cueing  -% of the time;able to follow multi-step instructions;needs cueing  -MJ     Personal Safety WNL/WFL  -MJ WNL/WFL  -MJ     Recorded by [MJ] Reagan Wang, PT [MJ] Reagan Wang, PT     General LE Assessment    ROM Detail R knee 19-80 degrees; pt lacking 19 degrees from full extension; pt seated to measure AAROM flexion  -MJ      Recorded by [MJ] Reagan Wang PT      Mobility Assessment/Training    Extremity Weight-Bearing Status right lower extremity  -MJ right lower extremity  -MJ     Right Lower Extremity Weight-Bearing weight-bearing as tolerated  -MJ weight-bearing as tolerated  -MJ     Recorded by [MJ] Reagan Wang, PT [MJ] Reagan Wang PT     Bed Mobility, Assessment/Treatment    Bed Mobility, Assistive Device bed rails;head of bed elevated;leg   -MJ bed rails;head of bed elevated  -MJ     Bed Mob, Supine to Sit, [place] contact guard assist;verbal cues required  -MJ not tested   Pt UIC  -MJ     Bed Mob, Sit to Supine, [place] supervision required;verbal cues required  -MJ supervision required;verbal cues required  -MJ     Bed Mobility, Safety Issues decreased use of legs for bridging/pushing  -MJ decreased use of legs for bridging/pushing  -MJ     Bed Mobility, Impairments ROM decreased;strength decreased;pain  -MJ ROM decreased;strength decreased;pain  -MJ     Bed Mobility, Comment Verbal cues for sequencing with leg  OOB, still requires CGA with R LE. No leg  required into bed. Increased time and effort to perform  -MJ Verbal cues for sequencing, increased time and effort to perform  -MJ     Recorded by [MJ] Reagan Wang, PT [MJ] Reagan Wang, PT     Transfer Assessment/Treatment    Transfers, Sit-Stand [place] contact guard assist;verbal cues required  -MJ contact guard assist;verbal cues required  -MJ      Transfers, Stand-Sit [place] contact guard assist;verbal cues required  -MJ contact guard assist;verbal cues required  -MJ     Transfers, Sit-Stand-Sit, Assist Device rolling walker  -MJ rolling walker  -MJ     Toilet Transfer, [place] contact guard assist;verbal cues required  -MJ contact guard assist;verbal cues required  -MJ     Toilet Transfer, Assistive Device rolling walker;other (see comments)   raised toilet, grab bar  -MJ rolling walker   raised toilet, grab bar  -MJ     Transfer, Safety Issues step length decreased;weight-shifting ability decreased  -MJ step length decreased;weight-shifting ability decreased  -MJ     Transfer, Impairments ROM decreased;strength decreased;pain  -MJ ROM decreased;strength decreased;pain  -MJ     Transfer, Comment Verbal cues for correct hand placement and to step R LE out prior to t/f  -MJ Verbal cues for correct hand placement and to step R LE out prior to t/f. Assisted pt to bathroom, independent with hygiene after toileting  -MJ     Recorded by [MJ] Reagan Wang PT [MJ] Reagan Wang PT     Gait Assessment/Treatment    Gait, [place] Level contact guard assist;verbal cues required  -MJ contact guard assist;verbal cues required  -MJ     Gait, Assistive Device rolling walker  -MJ rolling walker  -MJ     Gait, Distance (Feet) 460  -  -MJ     Gait, Gait Pattern Analysis swing-through gait  -MJ swing-through gait  -MJ     Gait, Gait Deviations right:;antalgic;skylar decreased;decreased heel strike;step length decreased;weight-shifting ability decreased  -MJ right:;antalgic;skylar decreased;decreased heel strike;step length decreased;toe-to-floor clearance decreased;weight-shifting ability decreased  -MJ     Gait, Safety Issues step length decreased;weight-shifting ability decreased  -MJ sequencing ability decreased;step length decreased;weight-shifting ability decreased  -MJ     Gait, Impairments ROM decreased;strength decreased;pain  -MJ ROM  decreased;strength decreased;pain  -MJ     Gait, Comment Pt initially demo step to gait pattern at slow pace, progressed to step through with verbal cues and practice. Cues to bend knee during swing to elicit heel strike and to increase LE weight bearing, mild improvement. Pt required 1 sitting rest break intermediate through prior to attempting stairs. Gait limited by pain and fatigue  -MJ Pt initially demo step to gait pattern, progressed to step through with verbal cues and practice. Cues for upright posture, to stay in middle of RW and ot increase LE weight bearing, mild improvement. Gait limited by pain and fatigue  -MJ     Recorded by [MJ] Reagan Wang PT [MJ] Reagan Wang PT     Stairs Assessment/Treatment    Number of Stairs 5  -MJ      Stairs, Handrail Location other (see comments)   x2 w/ B HR; x3 w/ 1 HR and cane  -MJ      Stairs, [place] Level contact guard assist;1 person + 1 person to manage equipment;verbal cues required  -MJ      Stairs, Assistive Device straight cane  -MJ      Stairs, Technique Used step to step (ascending);step to step (descending)  -MJ      Stairs, Safety Issues sequencing ability decreased;weight-shifting ability decreased  -MJ      Stairs, Impairments ROM decreased;strength decreased;pain  -MJ      Stairs, Comment Pt performed non-reciprocally. Verbal cues to ascend leading with L LE and to descend leading with R LE. Cues for sequencing with cane  -MJ      Recorded by [MJ] Reagan Wang PT      Therapy Exercises    Exercise Protocols total knee  -MJ total knee  -MJ     Total Knee Exercises right:;15 reps;completed protocol;with assist;ankle pumps/circles;quad set;glut set;heel slide stretch;SLR;SAQ;LAQ   Cues for technique. Michelle w/ SLR  -MJ right:;15 reps;completed protocol;with assist;ankle pumps/circles;quad set;glut set;heel slide stretch;SLR;SAQ;LAQ   Cues for technique. Michelle w/ initiation of SLR  -MJ     Recorded by [MJ] Reagan Wang, PT [MJ] Reagan Wang PT     Positioning  and Restraints    Pre-Treatment Position in bed  -MJ sitting in chair/recliner  -MJ     Post Treatment Position bed  -MJ bed  -MJ     In Bed notified nsg;supine;call light within reach;encouraged to call for assist;with family/caregiver  -MJ notified nsg;supine;call light within reach;encouraged to call for assist;with family/caregiver  -MJ     Recorded by [MJ] Reagan Wang, PT [MJ] Reagan Wang, PT       User Key  (r) = Recorded By, (t) = Taken By, (c) = Cosigned By    Initials Name Effective Dates    MJ Reagan Wang, PT 03/14/16 -                 IP PT Goals       11/14/17 1757 11/14/17 1127 11/13/17 1801    Bed Mobility PT LTG    Bed Mobility PT LTG, Date Established   11/13/17  -LR    Bed Mobility PT LTG, Time to Achieve   3 days  -LR    Bed Mobility PT LTG, Activity Type   supine to sit/sit to supine  -LR    Bed Mobility PT LTG, [place] Level   conditional independence  -LR    Bed Mobility PT LTG, Date Goal Reviewed 11/14/17  -MJ 11/14/17  -MJ 11/13/17  -LR    Bed Mobility PT LTG, Outcome goal ongoing  -MJ goal ongoing  - goal ongoing  -LR    Transfer Training PT LTG    Transfer Training PT LTG, Date Established   11/13/17  -LR    Transfer Training PT LTG, Time to Achieve   3 days  -LR    Transfer Training PT LTG, Activity Type   sit to stand/stand to sit  -LR    Transfer Training PT LTG, [place] Level   conditional independence  -LR    Transfer Training PT LTG, Assist Device   walker, rolling  -LR    Transfer Training PT  LTG, Date Goal Reviewed 11/14/17  -MJ 11/14/17  -MJ 11/13/17  -LR    Transfer Training PT LTG, Outcome goal ongoing  -MJ goal ongoing  -MJ goal ongoing  -LR    Gait Training PT LTG    Gait Training Goal PT LTG, Date Established   11/13/17  -LR    Gait Training Goal PT LTG, Time to Achieve   3 days  -LR    Gait Training Goal PT LTG, [place] Level   conditional independence  -LR    Gait Training Goal PT LTG, Assist Device   walker, rolling  -LR    Gait Training Goal PT LTG,  Distance to Achieve   500 feet  -LR    Gait Training Goal PT LTG, Date Goal Reviewed 11/14/17  -MJ 11/14/17  -MJ 11/13/17  -LR    Gait Training Goal PT LTG, Outcome goal ongoing  - goal ongoing  -MJ goal ongoing  -LR    Stair Training PT LTG    Stair Training Goal PT LTG, Date Established   11/13/17  -LR    Stair Training Goal PT LTG, Time to Achieve   3 days  -LR    Stair Training Goal PT LTG, Number of Steps   3  -LR    Stair Training Goal PT LTG, [place] Level   contact guard assist  -LR    Stair Training Goal PT LTG, Assist Device   1 handrail;cane, straight  -LR    Stair Training Goal PT LTG, Date Goal Reviewed 11/14/17  -MJ 11/14/17  -MJ 11/13/17  -LR    Stair Training Goal PT LTG, Outcome goal met  -MJ goal ongoing  - goal ongoing  -LR    Range of Motion PT LTG    Range of Motion Goal PT LTG, Date Established   11/13/17  -LR    Range of Motion Goal PT LTG, Time to Achieve   3 days  -LR    Range fo Motion Goal PT LTG, Joint   R knee  -LR    Range of Motion Goal PT LTG, AAROM Measure   0-90 degrees  -LR    Range of Motion Goal PT LTG, Date Goal Reviewed 11/14/17  -MJ 11/14/17  -MJ 11/13/17  -LR    Range of Motion Goal PT LTG, Outcome goal ongoing  -MJ goal ongoing  - goal ongoing  -LR      User Key  (r) = Recorded By, (t) = Taken By, (c) = Cosigned By    Initials Name Provider Type    LR Allison Saunders, PT Physical Therapist    GUERLINE Wang, PT Physical Therapist          Physical Therapy Education     Title: PT OT SLP Therapies (Done)     Topic: Physical Therapy (Done)     Point: Mobility training (Done)    Learning Progress Summary    Learner Readiness Method Response Comment Documented by Status   Patient Acceptance BERNARD,TRACEE GAXIOLA,NR Reviewed HEP, stairs, gait mechanics  11/14/17 1757 Done    Acceptance TRACEE WAGNER,NR Reviewed gait mechanics, HEP  11/14/17 1127 Done    Acceptance TB VU  TP 11/13/17 2105 Done    Acceptance TRACEE WAGNER,NR Educated on weight bearing status and precautions. LR 11/13/17  1832 Done   Family Acceptance E,D VU,NR Reviewed HEP, stairs, gait mechanics  11/14/17 1757 Done    Acceptance E,D VU,NR Reviewed gait mechanics, HEP  11/14/17 1127 Done   Mother Acceptance E,D VU,NR Educated on weight bearing status and precautions.  11/13/17 1832 Done   Father Acceptance E,D VU,NR Educated on weight bearing status and precautions.  11/13/17 1832 Done               Point: Home exercise program (Done)    Learning Progress Summary    Learner Readiness Method Response Comment Documented by Status   Patient Acceptance E,D VU,NR Reviewed HEP, stairs, gait mechanics  11/14/17 1757 Done    Acceptance E,D VU,NR Reviewed gait mechanics, HEP  11/14/17 1127 Done    Acceptance TB VU   11/13/17 2105 Done    Acceptance E,D VU,NR Educated on weight bearing status and precautions.  11/13/17 1832 Done   Family Acceptance E,D VU,NR Reviewed HEP, stairs, gait mechanics  11/14/17 1757 Done    Acceptance E,D VU,NR Reviewed gait mechanics, HEP  11/14/17 1127 Done   Mother Acceptance E,D VU,NR Educated on weight bearing status and precautions.  11/13/17 1832 Done   Father Acceptance E,D VU,NR Educated on weight bearing status and precautions.  11/13/17 1832 Done               Point: Body mechanics (Done)    Learning Progress Summary    Learner Readiness Method Response Comment Documented by Status   Patient Acceptance E,D VU,NR Reviewed HEP, stairs, gait mechanics  11/14/17 1757 Done    Acceptance E,D VU,NR Reviewed gait mechanics, HEP  11/14/17 1127 Done    Acceptance TB VU   11/13/17 2105 Done    Acceptance E,D VU,NR Educated on weight bearing status and precautions.  11/13/17 1832 Done   Family Acceptance E,D VU,NR Reviewed HEP, stairs, gait mechanics  11/14/17 1757 Done    Acceptance E,D VU,NR Reviewed gait mechanics, HEP  11/14/17 1127 Done   Mother Acceptance E,D VU,NR Educated on weight bearing status and precautions.  11/13/17 1832 Done   Father Acceptance E,D VU,NR Educated  on weight bearing status and precautions.  11/13/17 1832 Done               Point: Precautions (Done)    Learning Progress Summary    Learner Readiness Method Response Comment Documented by Status   Patient Acceptance E,D VU,NR Reviewed HEP, stairs, gait mechanics  11/14/17 1757 Done    Acceptance E,D VU,NR Reviewed gait mechanics, HEP  11/14/17 1127 Done    Acceptance TB VU   11/13/17 2105 Done    Acceptance E,D VU,NR Educated on weight bearing status and precautions.  11/13/17 1832 Done   Family Acceptance E,D VU,NR Reviewed HEP, stairs, gait mechanics  11/14/17 1757 Done    Acceptance E,D VU,NR Reviewed gait mechanics, HEP  11/14/17 1127 Done   Mother Acceptance E,D VU,NR Educated on weight bearing status and precautions.  11/13/17 1832 Done   Father Acceptance E,D VU,NR Educated on weight bearing status and precautions.  11/13/17 1832 Done                      User Key     Initials Effective Dates Name Provider Type Discipline     06/19/15 -  Allison Saunders, PT Physical Therapist PT     03/14/16 -  Reagan Wang, PT Physical Therapist PT     07/10/17 -  Laquita Roque, RN Registered Nurse Nurse                    PT Recommendation and Plan  Anticipated Equipment Needs At Discharge:  (none)  Anticipated Discharge Disposition: home with assist, home with home health  Planned Therapy Interventions: balance training, bed mobility training, gait training, home exercise program, patient/family education, ROM (Range of Motion), stair training, strengthening, transfer training  PT Frequency: 2 times/day  Plan of Care Review  Plan Of Care Reviewed With: patient  Progress: improving  Outcome Summary/Follow up Plan: Pt increased gait distance to 460 feet with CGA and RW and progressed to stair training this date. Pt's ROM measured 19-80 degrees. Pt anticipates discharge home tomorrow, will continue to progress mobility as able.           Outcome Measures       11/14/17 1614 11/14/17 1054  11/14/17 0942    How much help from another person do you currently need...    Turning from your back to your side while in flat bed without using bedrails? 3  -MJ 3  -MJ     Moving from lying on back to sitting on the side of a flat bed without bedrails? 3  -MJ 3  -MJ     Moving to and from a bed to a chair (including a wheelchair)? 3  -MJ 3  -MJ     Standing up from a chair using your arms (e.g., wheelchair, bedside chair)? 3  -MJ 3  -MJ     Climbing 3-5 steps with a railing? 3  -MJ 3  -MJ     To walk in hospital room? 3  -MJ 3  -MJ     AM-PAC 6 Clicks Score 18  -MJ 18  -MJ     How much help from another is currently needed...    Putting on and taking off regular lower body clothing?   3  -HK    Bathing (including washing, rinsing, and drying)   3  -HK    Toileting (which includes using toilet bed pan or urinal)   3  -HK    Putting on and taking off regular upper body clothing   4  -HK    Taking care of personal grooming (such as brushing teeth)   4  -HK    Eating meals   4  -HK    Score   21  -HK    Functional Assessment    Outcome Measure Options AM-PAC 6 Clicks Basic Mobility (PT)  -MJ AM-PAC 6 Clicks Basic Mobility (PT)  -[facility]-PAC 6 Clicks Daily Activity (OT)  -HK      11/13/17 1801          How much help from another person do you currently need...    Turning from your back to your side while in flat bed without using bedrails? 3  -LR      Moving from lying on back to sitting on the side of a flat bed without bedrails? 3  -LR      Moving to and from a bed to a chair (including a wheelchair)? 3  -LR      Standing up from a chair using your arms (e.g., wheelchair, bedside chair)? 3  -LR      Climbing 3-5 steps with a railing? 1  -LR      To walk in hospital room? 3  -LR      AM-PAC 6 Clicks Score 16  -LR      Functional Assessment    Outcome Measure Options AM-PAC 6 Clicks Basic Mobility (PT)  -LR        User Key  (r) = Recorded By, (t) = Taken By, (c) = Cosigned By    Initials Name Provider Type    CLAUDE Cooper  Brigid Saunders, PT Physical Therapist    MJ Reagan Wang, PT Physical Therapist    HK Opal Vizcarra, OT Occupational Therapist           Time Calculation:         PT Charges       11/14/17 1800 11/14/17 1129       Time Calculation    Start Time 1614  -MJ 1054  -MJ     PT Received On 11/14/17  -MJ 11/14/17  -MJ     PT Goal Re-Cert Due Date 11/23/17  -MJ 11/23/17  -MJ     Time Calculation- PT    Total Timed Code Minutes- PT 35 minute(s)  -MJ 27 minute(s)  -MJ       User Key  (r) = Recorded By, (t) = Taken By, (c) = Cosigned By    Initials Name Provider Type    GUERLINE Wang, PT Physical Therapist          Therapy Charges for Today     Code Description Service Date Service Provider Modifiers Qty    83915497548 HC GAIT TRAINING EA 15 MIN 11/14/2017 Reagan Wang, PT GP 1    80844780149 HC PT THER PROC EA 15 MIN 11/14/2017 Reagan Wang, PT GP 1    95721562386 HC GAIT TRAINING EA 15 MIN 11/14/2017 Reagan Wang, PT GP 1    30514038006 HC PT THER PROC EA 15 MIN 11/14/2017 Reagan Wang, PT GP 1    84561542599 HC PT THER SUPP EA 15 MIN 11/14/2017 Reagan Wang, PT GP 1          PT G-Codes  Outcome Measure Options: AM-PAC 6 Clicks Basic Mobility (PT)    Reagan Wang, PT  11/14/2017

## 2017-11-14 NOTE — PROGRESS NOTES
"IM progress note      Mel Brady  [id number]  1955     LOS: 1 day     Attending: Levy Weber MD    Primary Care Provider: Chrissy Guerra MD      Chief Complaint/Reason for visit:  No chief complaint on file.      Subjective   Pain is better controlled this am. No f/c/n/vom/sob.     Objective     Vital Signs  Blood pressure 112/57, pulse 68, temperature 99.1 °F (37.3 °C), temperature source Oral, resp. rate 16, height 62\" (157.5 cm), weight 260 lb (118 kg), SpO2 98 %.  Temp (24hrs), Av.3 °F (36.8 °C), Min:97.4 °F (36.3 °C), Max:99.1 °F (37.3 °C)      Intake/Output:    Intake/Output Summary (Last 24 hours) at 17 1131  Last data filed at 17 0900   Gross per 24 hour   Intake             2930 ml   Output              500 ml   Net             2430 ml       Physical Therapy:Pt increased gait distance to 230 feet with CGA and RW, limited by fatigue. Will obtain ROM measure in PM and continue to progress mobility as able.     Physical Exam:     General Appearance:    Alert, cooperative, in no acute distress   Head:    Normocephalic, without obvious abnormality, atraumatic    Lungs:     Normal effort, symmetric chest rise clear to  auscultation bilaterally               Heart:    Regular rhythm and normal rate, normal S1 and S2   Abdomen:     Normal bowel sounds, no masses, no organomegaly, soft        non-tender, non-distended, no guarding, no rebound                Tenderness.obese.   Extremities:   No clubbing, cyanosis or edema.  No deformities.    Right knee with CDI ACE. Right ACB cath present.    Pulses:   Pulses palpable and equal bilaterally   Skin:   No bleeding, bruising or rash   Neurologic:   Moves all extremities with no obvious focal motor deficit.  Cranial nerves 2 - 12 grossly intact  Flexion and dorsiflexion intact bilateral feet.       Results Review:     I reviewed the patient's new clinical results.     Results from last 7 days  Lab Units 17  0438   WBC 10*3/mm3 9.24 "   HEMOGLOBIN g/dL 12.4   HEMATOCRIT % 38.6   PLATELETS 10*3/mm3 247       Results from last 7 days  Lab Units 11/14/17  0438   SODIUM mmol/L 140   POTASSIUM mmol/L 3.7   CHLORIDE mmol/L 109   CO2 mmol/L 25.0   BUN mg/dL 11   CREATININE mg/dL 0.90   CALCIUM mg/dL 8.7   GLUCOSE mg/dL 104*     Results for YANNA MENDOZA (MRN [id number]) as of 11/14/2017 11:33   Ref. Range 11/14/2017 04:38 11/14/2017 06:43 11/14/2017 11:11   Glucose Latest Ref Range: 70 - 130 mg/dL 104 (H) 111 115     I reviewed the patient's new imaging including images and reports.    All medications reviewed.     aspirin 325 mg Oral Daily   docusate sodium 100 mg Oral BID   insulin lispro 0-7 Units Subcutaneous 4x Daily With Meals & Nightly   rosuvastatin 10 mg Oral Nightly       acetaminophen 650 mg Q4H PRN   acetaminophen 650 mg Q4H PRN   bisacodyl 10 mg Daily PRN   bisacodyl 10 mg Daily PRN   dextrose 25 g Q15 Min PRN   dextrose 15 g Q15 Min PRN   glucagon (human recombinant) 1 mg Q15 Min PRN   hydrALAZINE 10 mg Q6H PRN   HYDROcodone-acetaminophen 1 tablet Q4H PRN   HYDROmorphone 0.5 mg Q2H PRN   And     naloxone 0.1 mg Q5 Min PRN   ketorolac 15 mg Q6H PRN   magnesium hydroxide 10 mL Daily PRN   ondansetron 4 mg Q6H PRN   oxyCODONE-acetaminophen 1 tablet Q4H PRN   sodium chloride 500 mL TID PRN   sodium chloride 1-10 mL PRN       Assessment/Plan     Principal Problem:    S/P total knee arthroplasty, right  Active Problems:    Arthritis of right knee    HTN    HLD (hyperlipidemia)    Prediabetes         Plan  1. PT/OT.Weight bearing as tolerated RLE.  2. Pain control-prns, ACB.  3. IS-encouraged   4. DVT proph-Mech/ASA>  5. Bowel regimen  6. Monitor post-op labs, cancel accuchecks/ blood glucose in good range.  7. DC planning. Probably home tomorrow with HHPT.    Rodo Vann MD  11/14/17  11:31 AM

## 2017-11-14 NOTE — PROGRESS NOTES
[facility]    Acute pain service Inpatient Progress Note    Patient Name: Mel Brady  :  1955  MRN:  [id number]        Acute Pain  Service Inpatient Progress Note:    Analgesia:Excellent  Pain Score:0/10  LOC: asleep but arousable  Resp Status: room air  Cardiac: VS stable  Side Effects:None  Catheter Site:clean  Cath type: peripheral nerve cath(MOOG pump)  Infusion rate: 12ml/hr  Catheter Plan:RN to remove catheter

## 2017-11-14 NOTE — PLAN OF CARE
Problem: Patient Care Overview (Adult)  Goal: Plan of Care Review  Outcome: Ongoing (interventions implemented as appropriate)    11/14/17 1329   Coping/Psychosocial Response Interventions   Plan Of Care Reviewed With patient   Patient Care Overview   Progress improving   Outcome Evaluation   Outcome Summary/Follow up Plan Pt completed transfers with [facility]. OT educated pt on use of AE for LBD and LBB. Pt states she does not need to purchase she has AE at home. Recommend discharge home with assist and home health.          Problem: Inpatient Occupational Therapy  Goal: Bed Mobility Goal STG- OT  Outcome: Ongoing (interventions implemented as appropriate)    11/14/17 1329   Bed Mobility OT STG   Bed Mobility OT STG, Date Established 11/14/17   Bed Mobility OT STG, Time to Achieve by discharge   Bed Mobility OT STG, Activity Type scoot/bridge;supine to sit/sit to supine   Bed Mobility OT STG, [place] Level supervision required;minimum assist (75% patient effort);verbal cues required   Bed Mobility OT STG, Outcome goal ongoing       Goal: Transfer Training Goal 1 LTG- OT  Outcome: Ongoing (interventions implemented as appropriate)    11/14/17 1329   Transfer Training OT LTG   Transfer Training OT LTG, Date Established 11/14/17   Transfer Training OT LTG, Time to Achieve by discharge   Transfer Training OT LTG, Activity Type bed to chair /chair to bed;sit to stand/stand to sit;toilet   Transfer Training OT LTG, [place] Level contact guard assist;verbal cues required   Transfer Training OT LTG, Outcome goal ongoing       Goal: Patient Education Goal LTG- OT  Outcome: Ongoing (interventions implemented as appropriate)    11/14/17 1329   Patient Education OT LTG   Patient Education OT LTG, Date Established 11/14/17   Patient Education OT LTG, Time to Achieve by discharge   Patient Education OT LTG, Education Type precautions per surgeon;positioning;posture/body mechanics;1 hand/yousuf technique;adaptive equipment mgmt    Patient Education OT LTG, Education Understanding demonstrates adequately   Patient Education OT LTG Outcome goal ongoing       Goal: LB Dressing Goal LTG- OT  Outcome: Outcome(s) achieved Date Met:  11/14/17 11/14/17 1329   LB Dressing OT LTG   LB Dressing Goal OT LTG, Date Established 11/14/17   LB Dressing Goal OT LTG, Time to Achieve by discharge   LB Dressing Goal OT LTG, Activity Type don/doff socks   LB Dressing Goal OT LTG, [place] Level supervision required;verbal cues required   LB Dressing Goal OT LTG, Adaptive Equipment reacher;sock-aid   LB Dressing Goal OT LTG, Outcome goal met

## 2017-11-14 NOTE — PLAN OF CARE
Problem: Patient Care Overview (Adult)  Goal: Plan of Care Review  Outcome: Ongoing (interventions implemented as appropriate)    11/14/17 1558   Coping/Psychosocial Response Interventions   Plan Of Care Reviewed With patient   Patient Care Overview   Progress improving   Outcome Evaluation   Outcome Summary/Follow up Plan Patients pain well controlled by prn pain pills, patient has rested well today and worked well with therapy.          Problem: Perioperative Period (Adult)  Goal: Signs and Symptoms of Listed Potential Problems Will be Absent or Manageable (Perioperative Period)  Outcome: Ongoing (interventions implemented as appropriate)    11/14/17 1558   Perioperative Period   Problems Assessed (Perioperative Period) all   Problems Present (Perioperative Period) pain         Problem: Knee Replacement, Total (Adult)  Goal: Signs and Symptoms of Listed Potential Problems Will be Absent or Manageable (Knee Replacement, Total)  Outcome: Ongoing (interventions implemented as appropriate)    11/14/17 1558   Knee Replacement, Total   Problems Assessed (Total Knee Replacement) all   Problems Present (Total Knee Replacement) pain

## 2017-11-15 VITALS
TEMPERATURE: 97.8 F | RESPIRATION RATE: 17 BRPM | HEART RATE: 76 BPM | WEIGHT: 260 LBS | SYSTOLIC BLOOD PRESSURE: 127 MMHG | BODY MASS INDEX: 47.84 KG/M2 | OXYGEN SATURATION: 98 % | HEIGHT: 62 IN | DIASTOLIC BLOOD PRESSURE: 62 MMHG

## 2017-11-15 PROCEDURE — 97110 THERAPEUTIC EXERCISES: CPT

## 2017-11-15 PROCEDURE — 97116 GAIT TRAINING THERAPY: CPT

## 2017-11-15 PROCEDURE — 25010000002 ROPIVACAINE HCL-NACL 0.2-0.9 % SOLUTION: Performed by: NURSE ANESTHETIST, CERTIFIED REGISTERED

## 2017-11-15 PROCEDURE — 25010000002 ROPIVACAINE PER 1 MG: Performed by: NURSE ANESTHETIST, CERTIFIED REGISTERED

## 2017-11-15 RX ORDER — ONDANSETRON 4 MG/1
4 TABLET, FILM COATED ORAL EVERY 6 HOURS PRN
Status: DISCONTINUED | OUTPATIENT
Start: 2017-11-15 | End: 2017-11-15 | Stop reason: HOSPADM

## 2017-11-15 RX ORDER — OXYCODONE HYDROCHLORIDE AND ACETAMINOPHEN 5; 325 MG/1; MG/1
TABLET ORAL
Status: DISPENSED
Start: 2017-11-15 | End: 2017-11-15

## 2017-11-15 RX ORDER — PSEUDOEPHEDRINE HCL 30 MG
100 TABLET ORAL 2 TIMES DAILY
Qty: 60 CAPSULE | Refills: 0 | Status: SHIPPED | OUTPATIENT
Start: 2017-11-15

## 2017-11-15 RX ORDER — ONDANSETRON 4 MG/1
4 TABLET, FILM COATED ORAL EVERY 8 HOURS PRN
Qty: 20 TABLET | Refills: 0 | Status: SHIPPED | OUTPATIENT
Start: 2017-11-15

## 2017-11-15 RX ORDER — OXYCODONE HYDROCHLORIDE AND ACETAMINOPHEN 5; 325 MG/1; MG/1
1 TABLET ORAL EVERY 4 HOURS PRN
Qty: 40 TABLET | Refills: 0 | Status: SHIPPED | OUTPATIENT
Start: 2017-11-15 | End: 2017-11-23

## 2017-11-15 RX ORDER — ROPIVACAINE IN 0.9% SOD CHL/PF 0.2% 545ML
12 ELASTOMERIC PUMP, HI VARIABLE RATE INJECTION CONTINUOUS
Status: DISCONTINUED | OUTPATIENT
Start: 2017-11-15 | End: 2017-11-15 | Stop reason: HOSPADM

## 2017-11-15 RX ORDER — OXYCODONE HYDROCHLORIDE AND ACETAMINOPHEN 5; 325 MG/1; MG/1
1 TABLET ORAL EVERY 4 HOURS PRN
Qty: 40 TABLET | Refills: 0 | Status: SHIPPED | OUTPATIENT
Start: 2017-11-15 | End: 2017-11-15

## 2017-11-15 RX ORDER — OXYCODONE HYDROCHLORIDE AND ACETAMINOPHEN 5; 325 MG/1; MG/1
TABLET ORAL
Status: DISCONTINUED
Start: 2017-11-15 | End: 2017-11-15 | Stop reason: HOSPADM

## 2017-11-15 RX ORDER — ONDANSETRON 4 MG/1
TABLET, FILM COATED ORAL
Status: DISCONTINUED
Start: 2017-11-15 | End: 2017-11-15 | Stop reason: HOSPADM

## 2017-11-15 RX ADMIN — OXYCODONE AND ACETAMINOPHEN 1 TABLET: 5; 325 TABLET ORAL at 02:30

## 2017-11-15 RX ADMIN — ONDANSETRON HYDROCHLORIDE 4 MG: 4 TABLET, FILM COATED ORAL at 06:15

## 2017-11-15 RX ADMIN — ROPIVACAINE HYDROCHLORIDE 12 ML/HR: 2 INJECTION, SOLUTION EPIDURAL; INFILTRATION at 02:30

## 2017-11-15 RX ADMIN — OXYCODONE AND ACETAMINOPHEN 1 TABLET: 5; 325 TABLET ORAL at 10:31

## 2017-11-15 RX ADMIN — Medication 12 ML/HR: at 12:32

## 2017-11-15 RX ADMIN — DOCUSATE SODIUM 100 MG: 100 CAPSULE, LIQUID FILLED ORAL at 08:02

## 2017-11-15 RX ADMIN — ASPIRIN 325 MG: 325 TABLET, DELAYED RELEASE ORAL at 08:01

## 2017-11-15 RX ADMIN — OXYCODONE AND ACETAMINOPHEN 1 TABLET: 5; 325 TABLET ORAL at 15:08

## 2017-11-15 RX ADMIN — OXYCODONE AND ACETAMINOPHEN 1 TABLET: 5; 325 TABLET ORAL at 06:13

## 2017-11-15 NOTE — PROGRESS NOTES
[facility]    Acute pain service Inpatient Progress Note    Patient Name: Mel Brady  :  1955  MRN:  [id number]        Acute Pain  Service Inpatient Progress Note:    Analgesia:Excellent  Pain Score:2/10  LOC: alert and awake  Catheter Site:clean and dressing intact  Cath type: peripheral nerve cath with ON Q  Infusion rate: 12ml/hr  Catheter Plan:Patient to be discharged home

## 2017-11-15 NOTE — PLAN OF CARE
Problem: Patient Care Overview (Adult)  Goal: Plan of Care Review    11/15/17 0704   Coping/Psychosocial Response Interventions   Plan Of Care Reviewed With patient   Patient Care Overview   Progress progress toward functional goals as expected   Outcome Evaluation   Outcome Summary/Follow up Plan Pt is alert and oriented x4, VSS, c/o pain managed with PO PRN pain medication, denies numbness/tingling. Pt ambulated approx 100ft with assist x1 gait belt and walker. Dr. Weber and Dr. PONCE following patient. Plan is to d/c home with home health today 11-15-17       Goal: Discharge Needs Assessment    11/14/17 0942 11/14/17 1529   Discharge Needs Assessment   Concerns To Be Addressed --  discharge planning concerns;adjustment to diagnosis/illness concerns   Readmission Within The Last 30 Days --  no previous admission in last 30 days   Equipment Needed After Discharge --  none   Discharge Facility/Level Of Care Needs --  home with home health   Current Health   Outpatient/Agency/Support Group Needs --  homecare agency (specify level of care)   Anticipated Changes Related to Illness --  inability to care for self   Living Environment   Transportation Available --  car;family or friend will provide   Self-Care   Equipment Currently Used at Home bath bench;raised toilet;walker, rolling;cane, straight --          Problem: Knee Replacement, Total (Adult)  Intervention: Prevent/Manage DVT/VTE Risk    11/15/17 0613   Support Surgical/Anesthesia Recovery   Venous Thromboembolism Prevent/Manage bilateral;foot pump device on;left;compression stockings on         Goal: Signs and Symptoms of Listed Potential Problems Will be Absent or Manageable (Knee Replacement, Total)    11/14/17 1757 11/15/17 0704   Knee Replacement, Total   Problems Assessed (Total Knee Replacement) --  all   Problems Present (Total Knee Replacement) pain;decreased range of motion;functional decline/self care deficit --

## 2017-11-15 NOTE — DISCHARGE SUMMARY
Patient Name: Mel Brady  MRN: [id number]  : 1955  DOS: 11/15/2017    Attending: No att. providers found    Primary Care Provider: Chrissy Guerra MD    Date of Admission:.2017  8:50 AM    Date of Discharge:  11/15/2017    Discharge Diagnosis: Principal Problem:    S/P total knee arthroplasty, right  Active Problems:    Arthritis of right knee    HTN    HLD (hyperlipidemia)    Prediabetes      Hospital Course  Patient is a 62 y.o. female presented for right total knee arthroplasty by Dr. Weber under spinal anesthesia. She tolerated surgery well and was admitted for further medical management. Her knee has been painful for about 12 years. Conservative treatments have failed to provide long term pain relief. She denies use of assistive device for ambulation.      Patient was provided pain medications as needed for pain control, along with adductor canal nerve block infusion of Ropivacaine.      She was seen by PT and OT and has progressed well over her stay.  She used an IS for atelectasis prophylaxis and aspirin along with mechanicals for DVT prophylaxis.  Home medications were resumed as appropriate, and labs were monitored and remained fairly stable.     With the progress she has made, she is ready for DC home today.    A prineo dressing is in place and is to remain for 2 weeks.    She will have an On Q pump ( instructed on it during this admit)  Discussed with patient regarding plan and she shows understanding and agreement.    Patient will have HHPT following discharge.      Procedures Performed  2017     Pre-op Diagnosis:   Right knee OA      Post-Op Diagnosis Codes:  Right knee OA     Procedure/CPT® Codes:  56966     Procedure(s):  TOTAL KNEE ARTHROPLASTY RIGHT     Surgeon(s):  Levy Weber MD       Pertinent Test Results:    I reviewed the patient's new clinical results.     Results from last 7 days  Lab Units 17  0438   WBC 10*3/mm3 9.24   HEMOGLOBIN g/dL 12.4   HEMATOCRIT %  "38.6   PLATELETS 10*3/mm3 247       Results from last 7 days  Lab Units 17  0438   SODIUM mmol/L 140   POTASSIUM mmol/L 3.7   CHLORIDE mmol/L 109   CO2 mmol/L 25.0   BUN mg/dL 11   CREATININE mg/dL 0.90   CALCIUM mg/dL 8.7   GLUCOSE mg/dL 104*     Results for YANNA BRADY (MRN [id number]) as of 11/15/2017 10:50   Ref. Range 2017 21:02 2017 04:38 2017 06:43 2017 11:11   Glucose Latest Ref Range: 70 - 130 mg/dL 135 (H) 104 (H) 111 115     I reviewed the patient's new imaging including images and reports.      Physical therapy: Patient ambulated 460 feet with RW today, limited by soreness in quad. Completed stair training to simulate entrance to home. ROM progressing well, 12-80 degrees. Patient has been d/c home with HHPT today.     Discharge Assessment:    Vital Signs  /62 (Patient Position: Lying)  Pulse 76  Temp 97.8 °F (36.6 °C) (Oral)   Resp 17  Ht 62\" (157.5 cm)  Wt 260 lb (118 kg)  SpO2 98%  BMI 47.55 kg/m2  Temp (24hrs), Av.8 °F (36.6 °C), Min:97.8 °F (36.6 °C), Max:97.8 °F (36.6 °C)      General Appearance:    Alert, cooperative, in no acute distress   Lungs:     Clear to auscultation,respirations regular, even and                   unlabored    Heart:    Regular rhythm and normal rate, normal S1 and S2   Abdomen:     Normal bowel sounds, no masses, no organomegaly, soft        non-tender, non-distended, no guarding, no rebound                 tenderness   Extremities:   Moves all extremities well, no edema, no cyanosis, no              Redness. Right knee with clean dry and intact ACE wrap in place.  Right thigh adductor canal catheter present.   Pulses:   Pulses palpable and equal bilaterally   Skin:   No bleeding, bruising or rash   Neurologic:   Cranial nerves 2 - 12 grossly intact, sensation intact. Flexion and dorsiflexion intact bilateral feet.       Discharge Disposition: Home    Discharge Medications   Yanna Brady   Home Medication Instructions " GILLIAN:[id number]    Printed on:11/15/17 2200   Medication Information                      aspirin 325 MG EC tablet  Take 1 tablet by mouth Daily For 1 month             Cetirizine HCl (ZYRTEC ALLERGY PO)  Take 10 mg by mouth Daily As Needed (allergies).             diphenhydrAMINE-acetaminophen (TYLENOL PM)  MG tablet per tablet  Take 1 tablet by mouth At Night As Needed for Sleep.             docusate sodium 100 MG capsule  Take 1 capsule by mouth 2 (Two) Times a Day.             ondansetron (ZOFRAN) 4 MG tablet  Take 1 tablet by mouth Every 8 (Eight) Hours As Needed for Nausea or Vomiting.             oxyCODONE-acetaminophen (PERCOCET) 5-325 MG per tablet  Take 1 tablet by mouth Every 4 (Four) Hours As Needed for Moderate Pain for up to 8 days.             rosuvastatin (CRESTOR) 10 MG tablet  Take 10 mg by mouth Daily.             triamterene-hydrochlorothiazide (DYAZIDE) 37.5-25 MG per capsule  Take 1 capsule by mouth Every Morning.                 Discharge Diet: Consistent carb diet    Activity at Discharge: WBAT RLE    Follow-up Appointments  Dr. Weber per his orders    Discharge took over 30 min.  Seen and examined by me. Agree with above. Discussed with patient , answered all questions.bruna Vann MD  11/15/17  10:00 PM

## 2017-11-15 NOTE — PLAN OF CARE
Problem: Patient Care Overview (Adult)  Goal: Plan of Care Review  Outcome: Ongoing (interventions implemented as appropriate)    11/15/17 0835   Coping/Psychosocial Response Interventions   Plan Of Care Reviewed With patient;mother   Patient Care Overview   Progress progress toward functional goals as expected   Outcome Evaluation   Outcome Summary/Follow up Plan Patient ambulated 460 feet with RW today, limited by soreness in quad. Completed stair training to simulate entrance to home. ROM progressing well, 12-80 degrees. Patient has been d/c home with HHPT today.          Problem: Inpatient Physical Therapy  Goal: Bed Mobility Goal LTG- PT  Outcome: Unable to achieve outcome(s) by discharge Date Met:  11/15/17    11/15/17 0835   Bed Mobility PT LTG   Bed Mobility PT LTG, Date Established 11/13/17   Bed Mobility PT LTG, Time to Achieve 3 days   Bed Mobility PT LTG, Activity Type supine to sit/sit to supine   Bed Mobility PT LTG, [place] Level conditional independence   Bed Mobility PT LTG, Date Goal Reviewed 11/15/17   Bed Mobility PT LTG, Outcome goal not met   Bed Mobility PT LTG, Reason Goal Not Met discharged from facility       Goal: Transfer Training Goal 1 LTG- PT  Outcome: Unable to achieve outcome(s) by discharge Date Met:  11/15/17    11/15/17 0835   Transfer Training PT LTG   Transfer Training PT LTG, Date Established 11/13/17   Transfer Training PT LTG, Time to Achieve 3 days   Transfer Training PT LTG, Activity Type sit to stand/stand to sit   Transfer Training PT LTG, [place] Level conditional independence   Transfer Training PT LTG, Assist Device walker, rolling   Transfer Training PT LTG, Date Goal Reviewed 11/15/17   Transfer Training PT LTG, Outcome goal not met   Transfer Training PT LTG, Reason Goal Not Met discharged from facility       Goal: Gait Training Goal LTG- PT  Outcome: Unable to achieve outcome(s) by discharge Date Met:  11/15/17    11/15/17 0835   Gait Training PT LTG    Gait Training Goal PT LTG, Date Established 11/13/17   Gait Training Goal PT LTG, Time to Achieve 3 days   Gait Training Goal PT LTG, [place] Level conditional independence   Gait Training Goal PT LTG, Assist Device walker, rolling   Gait Training Goal PT LTG, Distance to Achieve 500 feet   Gait Training Goal PT LTG, Date Goal Reviewed 11/15/17   Gait Training Goal PT LTG, Outcome goal not met   Gait Training Goal PT LTG, Reason Goal Not Met discharged from facility       Goal: Range of Motion Goal LTG- PT  Outcome: Unable to achieve outcome(s) by discharge Date Met:  11/15/17    11/15/17 0835   Range of Motion PT LTG   Range of Motion Goal PT LTG, Date Established 11/13/17   Range of Motion Goal PT LTG, Time to Achieve 3 days   Range fo Motion Goal PT LTG, Joint R knee   Range of Motion Goal PT LTG, AAROM Measure 0-90 degrees   Range of Motion Goal PT LTG, Date Goal Reviewed 11/15/17   Range of Motion Goal PT LTG, Outcome goal not met   Reason Goal Not Met (ROM) PT LTG discharged from facility         Problem: Knee Replacement, Total (Adult)  Goal: Signs and Symptoms of Listed Potential Problems Will be Absent or Manageable (Knee Replacement, Total)  Outcome: Ongoing (interventions implemented as appropriate)    11/15/17 0835   Knee Replacement, Total   Problems Assessed (Total Knee Replacement) pain;decreased range of motion;functional decline/self care deficit   Problems Present (Total Knee Replacement) pain;decreased range of motion;functional decline/self care deficit

## 2017-11-15 NOTE — NURSING NOTE
Acute Pain Service:  On-Q teaching completed with patient and spouse.  Video demonstration, handout and bracelet provided with [facility] on call central phone number.  Instructed to call with any questions or concerns.  Patient verbalized understanding.  Service will continue to follow until catheter DC'd.  Please contact patient at [phone] if needed.

## 2017-11-15 NOTE — THERAPY DISCHARGE NOTE
Acute Care - Physical Therapy Treatment Note/Discharge   [facility]     Patient Name: Mel Brady  : 1955  MRN: [id number]  Today's Date: 11/15/2017  Onset of Illness/Injury or Date of Surgery Date: 17  Date of Referral to PT: 17  Referring Physician: MD Gus    Admit Date: 2017    Visit Dx:    ICD-10-CM ICD-9-CM   1. Impaired functional mobility, balance, gait, and endurance Z74.09 V49.89   2. Impaired mobility and ADLs Z74.09 799.89     Patient Active Problem List   Diagnosis   • Arthritis of right knee   • HTN   • HLD (hyperlipidemia)   • Prediabetes   • S/P total knee arthroplasty, right       Physical Therapy Education     Title: PT OT SLP Therapies (Done)     Topic: Physical Therapy (Done)     Point: Mobility training (Done)    Learning Progress Summary    Learner Readiness Method Response Comment Documented by Status   Patient Acceptance E,D,H VU,DU Educated on correct car t/f technique and stair training technique. Issued and reviewed written/illustrated HEP.  11/15/17 0914 Done    Acceptance E,D VU,NR Reviewed HEP, stairs, gait mechanics  17 Done    Acceptance E,D VU,NR Reviewed gait mechanics, HEP  17 Done    Acceptance TB VU   17 Done    Acceptance E,D VU,NR Educated on weight bearing status and precautions.  17 183 Done   Family Acceptance E,D VU,NR Reviewed HEP, stairs, gait mechanics  17 Done    Acceptance E,D VU,NR Reviewed gait mechanics, HEP  17 Done   Mother Acceptance E,D,H VU,DU Educated on correct car t/f technique and stair training technique. Issued and reviewed written/illustrated HEP.  11/15/17 0914 Done    Acceptance E,D VU,NR Educated on weight bearing status and precautions.  17 183 Done   Father Acceptance E,D VU,NR Educated on weight bearing status and precautions.  17 183 Done               Point: Home exercise program (Done)    Learning Progress Summary     Learner Readiness Method Response Comment Documented by Status   Patient Acceptance E,D,H VU,DU Educated on correct car t/f technique and stair training technique. Issued and reviewed written/illustrated HEP.  11/15/17 0914 Done    Acceptance E,D VU,NR Reviewed HEP, stairs, gait mechanics  11/14/17 1757 Done    Acceptance E,D VU,NR Reviewed gait mechanics, HEP  11/14/17 1127 Done    Acceptance TB VU   11/13/17 2105 Done    Acceptance E,D VU,NR Educated on weight bearing status and precautions.  11/13/17 1832 Done   Family Acceptance E,D VU,NR Reviewed HEP, stairs, gait mechanics  11/14/17 1757 Done    Acceptance E,D VU,NR Reviewed gait mechanics, HEP  11/14/17 1127 Done   Mother Acceptance E,D,H VU,DU Educated on correct car t/f technique and stair training technique. Issued and reviewed written/illustrated HEP.  11/15/17 0914 Done    Acceptance E,D VU,NR Educated on weight bearing status and precautions.  11/13/17 1832 Done   Father Acceptance E,D VU,NR Educated on weight bearing status and precautions.  11/13/17 1832 Done               Point: Body mechanics (Done)    Learning Progress Summary    Learner Readiness Method Response Comment Documented by Status   Patient Acceptance E,D,H VU,DU Educated on correct car t/f technique and stair training technique. Issued and reviewed written/illustrated HEP.  11/15/17 0914 Done    Acceptance E,D VU,NR Reviewed HEP, stairs, gait mechanics  11/14/17 1757 Done    Acceptance E,D VU,NR Reviewed gait mechanics, HEP  11/14/17 1127 Done    Acceptance TB VU   11/13/17 2105 Done    Acceptance E,D VU,NR Educated on weight bearing status and precautions.  11/13/17 1832 Done   Family Acceptance E,D VU,NR Reviewed HEP, stairs, gait mechanics  11/14/17 1757 Done    Acceptance E,D VU,NR Reviewed gait mechanics, HEP  11/14/17 1127 Done   Mother Acceptance E,D,H VU,DU Educated on correct car t/f technique and stair training technique. Issued and reviewed  written/illustrated HEP.  11/15/17 0914 Done    Acceptance E,D VU,NR Educated on weight bearing status and precautions.  11/13/17 1832 Done   Father Acceptance E,D VU,NR Educated on weight bearing status and precautions.  11/13/17 1832 Done               Point: Precautions (Done)    Learning Progress Summary    Learner Readiness Method Response Comment Documented by Status   Patient Acceptance E,D,H VU,DU Educated on correct car t/f technique and stair training technique. Issued and reviewed written/illustrated HEP.  11/15/17 0914 Done    Acceptance E,D VU,NR Reviewed HEP, stairs, gait mechanics  11/14/17 1757 Done    Acceptance E,D VU,NR Reviewed gait mechanics, HEP  11/14/17 1127 Done    Acceptance TB VU   11/13/17 2105 Done    Acceptance E,D VU,NR Educated on weight bearing status and precautions.  11/13/17 1832 Done   Family Acceptance E,D VU,NR Reviewed HEP, stairs, gait mechanics  11/14/17 1757 Done    Acceptance E,D VU,NR Reviewed gait mechanics, HEP  11/14/17 1127 Done   Mother Acceptance E,D,H VU,DU Educated on correct car t/f technique and stair training technique. Issued and reviewed written/illustrated HEP.  11/15/17 0914 Done    Acceptance E,D VU,NR Educated on weight bearing status and precautions.  11/13/17 1832 Done   Father Acceptance E,D VU,NR Educated on weight bearing status and precautions.  11/13/17 1832 Done                      User Key     Initials Effective Dates Name Provider Type Discipline     06/19/15 -  Allison Saunders, PT Physical Therapist PT     03/14/16 -  Reagan Wang, PT Physical Therapist PT    TP 07/10/17 -  Laquita Roque, RN Registered Nurse Nurse                    IP PT Goals       11/15/17 0835 11/14/17 1757 11/14/17 1127    Bed Mobility PT LTG    Bed Mobility PT LTG, Date Established 11/13/17  -LR      Bed Mobility PT LTG, Time to Achieve 3 days  -LR      Bed Mobility PT LTG, Activity Type supine to sit/sit to supine  -LR      Bed  Mobility PT LTG, [place] Level conditional independence  -LR      Bed Mobility PT LTG, Date Goal Reviewed 11/15/17  -LR 11/14/17  -MJ 11/14/17  -MJ    Bed Mobility PT LTG, Outcome goal not met  -LR goal ongoing  -MJ goal ongoing  -MJ    Bed Mobility PT LTG, Reason Goal Not Met discharged from facility  -LR      Transfer Training PT LTG    Transfer Training PT LTG, Date Established 11/13/17  -LR      Transfer Training PT LTG, Time to Achieve 3 days  -LR      Transfer Training PT LTG, Activity Type sit to stand/stand to sit  -LR      Transfer Training PT LTG, [place] Level conditional independence  -LR      Transfer Training PT LTG, Assist Device walker, rolling  -LR      Transfer Training PT  LTG, Date Goal Reviewed 11/15/17  -LR 11/14/17  -MJ 11/14/17  -MJ    Transfer Training PT LTG, Outcome goal not met  -LR goal ongoing  -MJ goal ongoing  -MJ    Transfer Training PT LTG, Reason Goal Not Met discharged from facility  -LR      Gait Training PT LTG    Gait Training Goal PT LTG, Date Established 11/13/17  -LR      Gait Training Goal PT LTG, Time to Achieve 3 days  -LR      Gait Training Goal PT LTG, [place] Level conditional independence  -LR      Gait Training Goal PT LTG, Assist Device walker, rolling  -LR      Gait Training Goal PT LTG, Distance to Achieve 500 feet  -LR      Gait Training Goal PT LTG, Date Goal Reviewed 11/15/17  -LR 11/14/17  -MJ 11/14/17  -MJ    Gait Training Goal PT LTG, Outcome goal not met  -LR goal ongoing  -MJ goal ongoing  -MJ    Gait Training Goal PT LTG, Reason Goal Not Met discharged from facility  -LR      Stair Training PT LTG    Stair Training Goal PT LTG, Date Goal Reviewed  11/14/17  -MJ 11/14/17  -MJ    Stair Training Goal PT LTG, Outcome  goal met  -MJ goal ongoing  -MJ    Range of Motion PT LTG    Range of Motion Goal PT LTG, Date Established 11/13/17  -LR      Range of Motion Goal PT LTG, Time to Achieve 3 days  -LR      Range fo Motion Goal PT LTG, Joint R knee   -LR      Range of Motion Goal PT LTG, AAROM Measure 0-90 degrees  -LR      Range of Motion Goal PT LTG, Date Goal Reviewed 11/15/17  -LR 11/14/17  -MJ 11/14/17  -MJ    Range of Motion Goal PT LTG, Outcome goal not met  -LR goal ongoing  -MJ goal ongoing  -MJ    Reason Goal Not Met (ROM) PT LTG discharged from facility  -LR        11/13/17 1801          Bed Mobility PT LTG    Bed Mobility PT LTG, Date Established 11/13/17  -LR      Bed Mobility PT LTG, Time to Achieve 3 days  -LR      Bed Mobility PT LTG, Activity Type supine to sit/sit to supine  -LR      Bed Mobility PT LTG, [place] Level conditional independence  -LR      Bed Mobility PT LTG, Date Goal Reviewed 11/13/17  -LR      Bed Mobility PT LTG, Outcome goal ongoing  -LR      Transfer Training PT LTG    Transfer Training PT LTG, Date Established 11/13/17  -LR      Transfer Training PT LTG, Time to Achieve 3 days  -LR      Transfer Training PT LTG, Activity Type sit to stand/stand to sit  -LR      Transfer Training PT LTG, [place] Level conditional independence  -LR      Transfer Training PT LTG, Assist Device walker, rolling  -LR      Transfer Training PT  LTG, Date Goal Reviewed 11/13/17  -LR      Transfer Training PT LTG, Outcome goal ongoing  -LR      Gait Training PT LTG    Gait Training Goal PT LTG, Date Established 11/13/17  -LR      Gait Training Goal PT LTG, Time to Achieve 3 days  -LR      Gait Training Goal PT LTG, [place] Level conditional independence  -LR      Gait Training Goal PT LTG, Assist Device walker, rolling  -LR      Gait Training Goal PT LTG, Distance to Achieve 500 feet  -LR      Gait Training Goal PT LTG, Date Goal Reviewed 11/13/17  -LR      Gait Training Goal PT LTG, Outcome goal ongoing  -LR      Stair Training PT LTG    Stair Training Goal PT LTG, Date Established 11/13/17  -LR      Stair Training Goal PT LTG, Time to Achieve 3 days  -LR      Stair Training Goal PT LTG, Number of Steps 3  -LR      Stair Training  Goal PT LTG, [place] Level contact guard assist  -LR      Stair Training Goal PT LTG, Assist Device 1 handrail;cane, straight  -LR      Stair Training Goal PT LTG, Date Goal Reviewed 11/13/17  -LR      Stair Training Goal PT LTG, Outcome goal ongoing  -LR      Range of Motion PT LTG    Range of Motion Goal PT LTG, Date Established 11/13/17  -LR      Range of Motion Goal PT LTG, Time to Achieve 3 days  -LR      Range fo Motion Goal PT LTG, Joint R knee  -LR      Range of Motion Goal PT LTG, AAROM Measure 0-90 degrees  -LR      Range of Motion Goal PT LTG, Date Goal Reviewed 11/13/17  -LR      Range of Motion Goal PT LTG, Outcome goal ongoing  -LR        User Key  (r) = Recorded By, (t) = Taken By, (c) = Cosigned By    Initials Name Provider Type    LR Allison Saunders, PT Physical Therapist    GUERLINE Wang, PT Physical Therapist              Adult Rehabilitation Note       11/15/17 0835 11/14/17 1614 11/14/17 1054    Rehab Assessment/Intervention    Discipline physical therapist  -LR physical therapist  -MJ physical therapist  -MJ    Document Type therapy note (daily note);discharge summary  -LR therapy note (daily note)  -MJ therapy note (daily note)  -MJ    Subjective Information agree to therapy;no complaints  -LR agree to therapy;complains of;pain  -MJ agree to therapy;no complaints  -MJ    Patient Effort, Rehab Treatment excellent  -LR excellent  -MJ good  -MJ    Symptoms Noted During/After Treatment fatigue;increased pain  -LR fatigue  -MJ fatigue;increased pain  -MJ    Symptoms Noted Comment Notified RN.   -LR      Precautions/Limitations fall precautions;other (see comments)   R adductor canal nerve catheter  -LR fall precautions;other (see comments)   R adductor nerve catheter  -MJ fall precautions;other (see comments)   R adductor nerve catheter  -MJ    Equipment Issued to Patient gait belt  -LR      Recorded by [LR] Allison Saunders, PT [MJ] Reagan Wang PT [MJ] Reagan Wang, PT    Pain  Assessment    Pain Assessment 0-10  -LR 0-10  -MJ 0-10  -MJ    Pain Score 0  -LR 5  -MJ 0  -MJ    Post Pain Score 3  -LR 3  -MJ 1  -MJ    Pain Type Acute pain  -LR Acute pain  -MJ Acute pain  -MJ    Pain Location Knee  -LR Knee  -MJ Knee  -MJ    Pain Orientation Right;Anterior  -LR Right;Anterior;Posterior  -MJ Right;Anterior  -MJ    Pain Radiating Towards Quad  -LR      Pain Intervention(s) Repositioned;Ambulation/increased activity  -LR Repositioned;Ambulation/increased activity;Cold pack  -MJ Repositioned;Ambulation/increased activity;Cold pack  -MJ    Recorded by [LR] Allison Saunders, PT [MJ] Reagan Wang, PT [MJ] Reagan Wang, PT    Cognitive Assessment/Intervention    Current Cognitive/Communication Assessment functional  -LR functional  -MJ functional  -MJ    Orientation Status oriented x 4;required verbal cueing (specifiy in comments)  -LR oriented x 4  -MJ oriented x 4  -MJ    Follows Commands/Answers Questions 100% of the time;able to follow single-step instructions;needs cueing;needs repetition  -% of the time;able to follow multi-step instructions;needs cueing  -% of the time;able to follow multi-step instructions;needs cueing  -MJ    Personal Safety WNL/WFL  -LR WNL/WFL  -MJ WNL/WFL  -MJ    Recorded by [LR] Allison Saunders, PT [MJ] Reagan Wang, PT [MJ] Reagan Wang, PT    General LE Assessment    ROM Detail 12-80 degrees; lacking 12 degrees of extension AROM, 80 degrees flexion AAROM in sitting.   -LR R knee 19-80 degrees; pt lacking 19 degrees from full extension; pt seated to measure AAROM flexion  -MJ     Recorded by [LR] Allison Saunders, PT [MJ] Reagan Wang, PT     Mobility Assessment/Training    Extremity Weight-Bearing Status right lower extremity  -LR right lower extremity  -MJ right lower extremity  -MJ    Right Lower Extremity Weight-Bearing weight-bearing as tolerated  -LR weight-bearing as tolerated  -MJ weight-bearing as tolerated  -MJ    Recorded by [LR] Allison  Brigid Saunders, PT [MJ] Reagan Wang, PT [MJ] Reagan Wang, PT    Bed Mobility, Assessment/Treatment    Bed Mobility, Assistive Device  bed rails;head of bed elevated;leg   -MJ bed rails;head of bed elevated  -MJ    Bed Mob, Supine to Sit, [place] not tested   UIC on arrival.   -LR contact guard assist;verbal cues required  -MJ not tested   Pt UIC  -MJ    Bed Mob, Sit to Supine, [place] not tested   UIC at end of treatment.   -LR supervision required;verbal cues required  -MJ supervision required;verbal cues required  -MJ    Bed Mobility, Safety Issues  decreased use of legs for bridging/pushing  -MJ decreased use of legs for bridging/pushing  -MJ    Bed Mobility, Impairments  ROM decreased;strength decreased;pain  -MJ ROM decreased;strength decreased;pain  -MJ    Bed Mobility, Comment  Verbal cues for sequencing with leg  OOB, still requires CGA with R LE. No leg  required into bed. Increased time and effort to perform  -MJ Verbal cues for sequencing, increased time and effort to perform  -MJ    Recorded by [LR] Allison Saunders, PT [MJ] Reagan Wang, PT [MJ] Reagan Wang, PT    Transfer Assessment/Treatment    Transfers, Sit-Stand [place] verbal cues required;contact guard assist  -LR contact guard assist;verbal cues required  -MJ contact guard assist;verbal cues required  -MJ    Transfers, Stand-Sit [place] verbal cues required;contact guard assist  -LR contact guard assist;verbal cues required  -MJ contact guard assist;verbal cues required  -MJ    Transfers, Sit-Stand-Sit, Assist Device rolling walker  -LR rolling walker  -MJ rolling walker  -MJ    Toilet Transfer, [place]  contact guard assist;verbal cues required  -MJ contact guard assist;verbal cues required  -MJ    Toilet Transfer, Assistive Device  rolling walker;other (see comments)   raised toilet, grab bar  -MJ rolling walker   raised toilet, grab bar  -MJ    Transfer, Safety Issues sequencing ability  decreased;step length decreased;weight-shifting ability decreased  -LR step length decreased;weight-shifting ability decreased  -MJ step length decreased;weight-shifting ability decreased  -MJ    Transfer, Impairments ROM decreased;strength decreased;pain  -LR ROM decreased;strength decreased;pain  -MJ ROM decreased;strength decreased;pain  -MJ    Transfer, Comment Demonstrates correct hand placement without cues. Verbal cues to step R LE out before t/f for comfort.   -LR Verbal cues for correct hand placement and to step R LE out prior to t/f  -MJ Verbal cues for correct hand placement and to step R LE out prior to t/f. Assisted pt to bathroom, independent with hygiene after toileting  -MJ    Recorded by [LR] Allison Saunders, PT [MJ] Reagan Wang PT [MJ] Reagan Wang, PT    Gait Assessment/Treatment    Gait, [place] Level verbal cues required;contact guard assist  -LR contact guard assist;verbal cues required  -MJ contact guard assist;verbal cues required  -MJ    Gait, Assistive Device rolling walker  -LR rolling walker  -MJ rolling walker  -MJ    Gait, Distance (Feet) 460  -  -  -MJ    Gait, Gait Pattern Analysis swing-through gait  -LR swing-through gait  -MJ swing-through gait  -MJ    Gait, Gait Deviations right:;antalgic;decreased heel strike;forward flexed posture;step length decreased;toe-to-floor clearance decreased;weight-shifting ability decreased  -LR right:;antalgic;[place] decreased;decreased heel strike;step length decreased;weight-shifting ability decreased  -MJ right:;antalgic;skylar decreased;decreased heel strike;step length decreased;toe-to-floor clearance decreased;weight-shifting ability decreased  -MJ    Gait, Safety Issues sequencing ability decreased;step length decreased;weight-shifting ability decreased  -LR step length decreased;weight-shifting ability decreased  -MJ sequencing ability decreased;step length decreased;weight-shifting ability decreased  -MJ    Gait,  Impairments ROM decreased;strength decreased;pain  -LR ROM decreased;strength decreased;pain  -MJ ROM decreased;strength decreased;pain  -MJ    Gait, Comment Patient initiated gait with step to gait pattern with short stance phase on R and quick step forward with L LE. Improved with cues for increased R LE weight bearing/stance phase and decreased UE weight bearing. Able to progress to step through gait pattern. Required a few standing rest breaks d/t fatigue. Gait limited by pain and fatigue.   -LR Pt initially demo step to gait pattern at slow pace, progressed to step through with verbal cues and practice. Cues to bend knee during swing to elicit heel strike and to increase LE weight bearing, mild improvement. Pt required 1 sitting rest break [facility] through prior to attempting stairs. Gait limited by pain and fatigue  -MJ Pt initially demo step to gait pattern, progressed to step through with verbal cues and practice. Cues for upright posture, to stay in middle of RW and ot increase LE weight bearing, mild improvement. Gait limited by pain and fatigue  -MJ    Recorded by [LR] Allison Saunders, PT [MJ] Reagan Wang, PT [MJ] Reagan Wang, PT    Stairs Assessment/Treatment    Number of Stairs 3  -LR 5  -MJ     Stairs, Handrail Location left side (ascending)  -LR other (see comments)   x2 w/ B HR; x3 w/ 1 HR and cane  -MJ     Stairs, [place] Level verbal cues required;contact guard assist  -LR contact guard assist;1 person + 1 person to manage equipment;verbal cues required  -MJ     Stairs, Assistive Device straight cane  -LR straight cane  -MJ     Stairs, Technique Used step to step (ascending);step to step (descending)  -LR step to step (ascending);step to step (descending)  -MJ     Stairs, Safety Issues sequencing ability decreased;weight-shifting ability decreased  -LR sequencing ability decreased;weight-shifting ability decreased  -MJ     Stairs, Impairments ROM decreased;strength decreased;impaired  balance;pain  -LR ROM decreased;strength decreased;pain  -MJ     Stairs, Comment Verbal cues to take one step at a time and to step up with strong LE first and down with weak LE first. Verbal cues for correct placement and sequencing of SPC. Patient unsteady with first 2 steps descending. Balance improved on last step descending with PT stabilizing SPC.   -LR Pt performed non-reciprocally. Verbal cues to ascend leading with L LE and to descend leading with R LE. Cues for sequencing with cane  -MJ     Recorded by [LR] Allison Saunders, PT [MJ] Reagan Wang PT     Therapy Exercises    Exercise Protocols total knee  -LR total knee  -MJ total knee  -MJ    Total Knee Exercises right:;15 reps;completed protocol;with assist;ankle pumps/circles;quad set;glut set;SLR;heel slide stretch;SAQ;LAQ   cues for technique;min assist SLR,SAQ,LAQ  -LR right:;15 reps;completed protocol;with assist;ankle pumps/circles;quad set;glut set;heel slide stretch;SLR;SAQ;LAQ   Cues for technique. Michelle w/ SLR  -MJ right:;15 reps;completed protocol;with assist;ankle pumps/circles;quad set;glut set;heel slide stretch;SLR;SAQ;LAQ   Cues for technique. iMchelle w/ initiation of SLR  -MJ    Recorded by [LR] Allison Saunders, PT [MJ] Reagan Wang, PT [MJ] Reagan Wang PT    Positioning and Restraints    Pre-Treatment Position sitting in chair/recliner  -LR in bed  -MJ sitting in chair/recliner  -MJ    Post Treatment Position chair  -LR bed  -MJ bed  -MJ    In Bed  notified nsg;supine;call light within reach;encouraged to call for assist;with family/caregiver  -MJ notified nsg;supine;call light within reach;encouraged to call for assist;with family/caregiver  -MJ    In Chair notified nsg;reclined;sitting;call light within reach;encouraged to call for assist;with family/caregiver;legs elevated  -LR      Recorded by [LR] Allison Saunders, PT [MJ] Reagan Wang, PT [MJ] Reagan Wang PT      User Key  (r) = Recorded By, (t) = Taken By, (c) =  Cosigned By    Initials Name Effective Dates    LR Allison Saunders, PT 06/19/15 -     MJ Reagan Wang, PT 03/14/16 -           PT Recommendation and Plan  Anticipated Equipment Needs At Discharge:  (none)  Anticipated Discharge Disposition: home with assist, home with home health  Planned Therapy Interventions: balance training, bed mobility training, gait training, home exercise program, patient/family education, ROM (Range of Motion), stair training, strengthening, transfer training  PT Frequency: 2 times/day  Plan of Care Review  Plan Of Care Reviewed With: patient, mother  Progress: progress toward functional goals as expected  Outcome Summary/Follow up Plan: Patient ambulated 460 feet with RW today, limited by soreness in quad. Completed stair training to simulate entrance to home. ROM progressing well, 12-80 degrees. Patient has been d/c home with HHPT today.           Outcome Measures       11/15/17 0835 11/14/17 1614 11/14/17 1054    How much help from another person do you currently need...    Turning from your back to your side while in flat bed without using bedrails? 3  -LR 3  -MJ 3  -MJ    Moving from lying on back to sitting on the side of a flat bed without bedrails? 3  -LR 3  -MJ 3  -MJ    Moving to and from a bed to a chair (including a wheelchair)? 3  -LR 3  -MJ 3  -MJ    Standing up from a chair using your arms (e.g., wheelchair, bedside chair)? 3  -LR 3  -MJ 3  -MJ    Climbing 3-5 steps with a railing? 3  -LR 3  -MJ 3  -MJ    To walk in hospital room? 3  -LR 3  -MJ 3  -MJ    AM-PAC 6 Clicks Score 18  -LR 18  -MJ 18  -MJ    Functional Assessment    Outcome Measure Options AM-PAC 6 Clicks Basic Mobility (PT)  -LR AM-PAC 6 Clicks Basic Mobility (PT)  -MJ AM-PAC 6 Clicks Basic Mobility (PT)  -MJ      11/14/17 0942 11/13/17 1801       How much help from another person do you currently need...    Turning from your back to your side while in flat bed without using bedrails?  3  -LR     Moving from  lying on back to sitting on the side of a flat bed without bedrails?  3  -LR     Moving to and from a bed to a chair (including a wheelchair)?  3  -LR     Standing up from a chair using your arms (e.g., wheelchair, bedside chair)?  3  -LR     Climbing 3-5 steps with a railing?  1  -LR     To walk in hospital room?  3  -LR     AM-PAC 6 Clicks Score  16  -LR     How much help from another is currently needed...    Putting on and taking off regular lower body clothing? 3  -HK      Bathing (including washing, rinsing, and drying) 3  -HK      Toileting (which includes using toilet bed pan or urinal) 3  -HK      Putting on and taking off regular upper body clothing 4  -HK      Taking care of personal grooming (such as brushing teeth) 4  -HK      Eating meals 4  -HK      Score 21  -HK      Functional Assessment    Outcome Measure Options AM-PAC 6 Clicks Daily Activity (OT)  -HK AM-PAC 6 Clicks Basic Mobility (PT)  -LR       User Key  (r) = Recorded By, (t) = Taken By, (c) = Cosigned By    Initials Name Provider Type    LR Allison Saunders, PT Physical Therapist    GUERLINE Wang, PT Physical Therapist    HK Opal Vizcarra, OT Occupational Therapist           Time Calculation:         PT Charges       11/15/17 0917          Time Calculation    Start Time 0835  -LR      PT Received On 11/15/17  -LR      PT Goal Re-Cert Due Date 11/23/17  -LR      Time Calculation- PT    Total Timed Code Minutes- PT 29 minute(s)  -LR        User Key  (r) = Recorded By, (t) = Taken By, (c) = Cosigned By    Initials Name Provider Type    LR Allison Saunders, PT Physical Therapist          Therapy Charges for Today     Code Description Service Date Service Provider Modifiers Qty    37107082996 HC GAIT TRAINING EA 15 MIN 11/15/2017 Allison Saunders, PT GP 1    58712651669 HC PT THER PROC EA 15 MIN 11/15/2017 Allison Saunders, PT GP 1          PT G-Codes  Outcome Measure Options: AM-PAC 6 Clicks Basic Mobility (PT)    PT  Discharge Summary  Anticipated Discharge Disposition: home with assist, home with home health  Reason for Discharge: Discharge from facility  Outcomes Achieved: Patient able to partially acheive established goals  Discharge Destination: Home with assist, Home with home health    Allison Saunders, PT  11/15/2017

## 2017-11-15 NOTE — PROGRESS NOTES
"Mel Brady  [id number]  1955    /62 (Patient Position: Lying)  Pulse 76  Temp 97.8 °F (36.6 °C) (Oral)   Resp 17  Ht 62\" (157.5 cm)  Wt 260 lb (118 kg)  SpO2 98%  BMI 47.55 kg/m2    Lab Results (last 24 hours)     ** No results found for the last 24 hours. **          Patient Care Team:  Chrissy Guerra MD as PCP - General  Chrissy Guerra MD as PCP - Family Medicine    SUBJECTIVE  Pain well controlled.  Good start in physical therapy.    PHYSICAL EXAM  Incision benign  Minimal thigh swelling  Neurovascularly intact to knees and toes     Principal Problem:    S/P total knee arthroplasty, right  Active Problems:    Arthritis of right knee    HTN    HLD (hyperlipidemia)    Prediabetes      PLAN / DISPOSITION:  Discharge home today    Levy Weber MD  11/15/17  4:18 PM  "

## 2017-11-16 NOTE — PROGRESS NOTES
IDAL[facility] Muj[facility]    Nerve Cath Post Op Call    Patient Name: Mel Brady  :  1955  MRN:  [id number]  Date of Discharge: 11/15/2017    Nerve Cath Post Op Call:    Catheter Plan:Patient called/No answer/Message left to call [facility] pain service for any questions or complaints

## 2017-11-17 LAB
ABO + RH BLD: NORMAL
ABO + RH BLD: NORMAL
BH BB BLOOD EXPIRATION DATE: NORMAL
BH BB BLOOD EXPIRATION DATE: NORMAL
BH BB BLOOD TYPE BARCODE: 5100
BH BB BLOOD TYPE BARCODE: 5100
BH BB DISPENSE STATUS: NORMAL
BH BB DISPENSE STATUS: NORMAL
BH BB PRODUCT CODE: NORMAL
BH BB PRODUCT CODE: NORMAL
BH BB UNIT NUMBER: NORMAL
BH BB UNIT NUMBER: NORMAL
UNIT  ABO: NORMAL
UNIT  ABO: NORMAL
UNIT  RH: NORMAL
UNIT  RH: NORMAL

## 2017-11-17 NOTE — PROGRESS NOTES
IDALIA Mujica    Nerve Cath Post Op Call    Patient Name: Mel Brady  :  1955  MRN:  [id number]  Date of Discharge: 11/15/2017    Nerve Cath Post Op Call:    Catheter Plan:Patient called/No answer/Message left to call [facility] pain service for any questions or complaints

## 2017-11-17 NOTE — PROGRESS NOTES
IDALIA Mujica    Nerve Cath Post Op Call    Patient Name: Mel Brady  :  1955  MRN:  [id number]  Date of Discharge: 11/15/2017    Nerve Cath Post Op Call:    Analgesia:Good  Catheter Plan:Patient/Family member report nerve catheter previously discontinued, tip intact

## 2018-03-15 ENCOUNTER — TRANSCRIBE ORDERS (OUTPATIENT)
Dept: ADMINISTRATIVE | Facility: HOSPITAL | Age: 63
End: 2018-03-15

## 2018-03-15 DIAGNOSIS — Z12.31 VISIT FOR SCREENING MAMMOGRAM: Primary | ICD-10-CM

## 2018-04-30 ENCOUNTER — HOSPITAL ENCOUNTER (OUTPATIENT)
Dept: MAMMOGRAPHY | Facility: HOSPITAL | Age: 63
Discharge: HOME | End: 2018-04-30
Attending: FAMILY MEDICINE | Admitting: FAMILY MEDICINE

## 2018-04-30 DIAGNOSIS — Z12.31 VISIT FOR SCREENING MAMMOGRAM: ICD-10-CM

## 2018-04-30 PROCEDURE — 77067 SCR MAMMO BI INCL CAD: CPT

## 2018-04-30 PROCEDURE — 77067 SCR MAMMO BI INCL CAD: CPT | Performed by: RADIOLOGY

## 2018-04-30 PROCEDURE — 77063 BREAST TOMOSYNTHESIS BI: CPT

## 2018-04-30 PROCEDURE — 77063 BREAST TOMOSYNTHESIS BI: CPT | Performed by: RADIOLOGY

## 2018-09-05 ENCOUNTER — LAB REQUISITION (OUTPATIENT)
Dept: LAB | Facility: HOSPITAL | Age: 63
End: 2018-09-05

## 2018-09-05 DIAGNOSIS — Z00.00 ROUTINE GENERAL MEDICAL EXAMINATION AT A HEALTH CARE FACILITY: ICD-10-CM

## 2018-09-05 PROCEDURE — 36415 COLL VENOUS BLD VENIPUNCTURE: CPT

## 2019-06-11 ENCOUNTER — TRANSCRIBE ORDERS (OUTPATIENT)
Dept: ADMINISTRATIVE | Facility: HOSPITAL | Age: 64
End: 2019-06-11

## 2019-06-11 DIAGNOSIS — Z12.31 VISIT FOR SCREENING MAMMOGRAM: Primary | ICD-10-CM

## 2019-08-05 ENCOUNTER — HOSPITAL ENCOUNTER (OUTPATIENT)
Dept: MAMMOGRAPHY | Facility: HOSPITAL | Age: 64
Discharge: HOME | End: 2019-08-05
Admitting: FAMILY MEDICINE

## 2019-08-05 DIAGNOSIS — Z12.31 VISIT FOR SCREENING MAMMOGRAM: ICD-10-CM

## 2019-08-05 PROCEDURE — 77063 BREAST TOMOSYNTHESIS BI: CPT | Performed by: RADIOLOGY

## 2019-08-05 PROCEDURE — 77067 SCR MAMMO BI INCL CAD: CPT | Performed by: RADIOLOGY

## 2019-08-05 PROCEDURE — 77063 BREAST TOMOSYNTHESIS BI: CPT

## 2019-08-05 PROCEDURE — 77067 SCR MAMMO BI INCL CAD: CPT

## 2020-09-21 ENCOUNTER — TRANSCRIBE ORDERS (OUTPATIENT)
Dept: ADMINISTRATIVE | Facility: HOSPITAL | Age: 65
End: 2020-09-21

## 2020-09-21 DIAGNOSIS — Z12.31 VISIT FOR SCREENING MAMMOGRAM: Primary | ICD-10-CM

## 2020-12-17 ENCOUNTER — HOSPITAL ENCOUNTER (OUTPATIENT)
Dept: MAMMOGRAPHY | Facility: HOSPITAL | Age: 65
Discharge: HOME | End: 2020-12-17
Admitting: FAMILY MEDICINE

## 2020-12-17 DIAGNOSIS — Z12.31 VISIT FOR SCREENING MAMMOGRAM: ICD-10-CM

## 2020-12-17 PROCEDURE — 77067 SCR MAMMO BI INCL CAD: CPT | Performed by: RADIOLOGY

## 2020-12-17 PROCEDURE — 77063 BREAST TOMOSYNTHESIS BI: CPT | Performed by: RADIOLOGY

## 2020-12-17 PROCEDURE — 77067 SCR MAMMO BI INCL CAD: CPT

## 2020-12-17 PROCEDURE — 77063 BREAST TOMOSYNTHESIS BI: CPT

## 2021-12-02 ENCOUNTER — TRANSCRIBE ORDERS (OUTPATIENT)
Dept: ADMINISTRATIVE | Facility: HOSPITAL | Age: 66
End: 2021-12-02

## 2021-12-02 DIAGNOSIS — Z12.31 VISIT FOR SCREENING MAMMOGRAM: Primary | ICD-10-CM

## 2022-01-07 ENCOUNTER — APPOINTMENT (OUTPATIENT)
Dept: MAMMOGRAPHY | Facility: HOSPITAL | Age: 67
End: 2022-01-07

## 2022-02-04 ENCOUNTER — APPOINTMENT (OUTPATIENT)
Dept: MAMMOGRAPHY | Facility: HOSPITAL | Age: 67
End: 2022-02-04

## 2022-02-16 ENCOUNTER — PRE-ADMISSION TESTING (OUTPATIENT)
Dept: PREADMISSION TESTING | Facility: HOSPITAL | Age: 67
End: 2022-02-16

## 2022-02-16 LAB
ALBUMIN SERPL-MCNC: 4.6 G/DL (ref 3.5–5.2)
ALBUMIN/GLOB SERPL: 1.7 G/DL
ALP SERPL-CCNC: 92 U/L (ref 39–117)
ALT SERPL W P-5'-P-CCNC: 13 U/L (ref 1–33)
ANION GAP SERPL CALCULATED.3IONS-SCNC: 16 MMOL/L (ref 5–15)
AST SERPL-CCNC: 16 U/L (ref 1–32)
BILIRUB SERPL-MCNC: 0.7 MG/DL (ref 0–1.2)
BUN SERPL-MCNC: 15 MG/DL (ref 8–23)
BUN/CREAT SERPL: 13 (ref 7–25)
CALCIUM SPEC-SCNC: 10.1 MG/DL (ref 8.6–10.5)
CHLORIDE SERPL-SCNC: 104 MMOL/L (ref 98–107)
CO2 SERPL-SCNC: 20 MMOL/L (ref 22–29)
CREAT SERPL-MCNC: 1.15 MG/DL (ref 0.57–1)
DEPRECATED RDW RBC AUTO: 44.1 FL (ref 37–54)
ERYTHROCYTE [DISTWIDTH] IN BLOOD BY AUTOMATED COUNT: 13.7 % (ref 12.3–15.4)
GFR SERPL CREATININE-BSD FRML MDRD: 48 ML/MIN/1.73
GLOBULIN UR ELPH-MCNC: 2.7 GM/DL
GLUCOSE SERPL-MCNC: 114 MG/DL (ref 65–99)
HCT VFR BLD AUTO: 46.3 % (ref 34–46.6)
HGB BLD-MCNC: 15.2 G/DL (ref 12–15.9)
LIPASE SERPL-CCNC: 43 U/L (ref 13–60)
MCH RBC QN AUTO: 29.1 PG (ref 26.6–33)
MCHC RBC AUTO-ENTMCNC: 32.8 G/DL (ref 31.5–35.7)
MCV RBC AUTO: 88.7 FL (ref 79–97)
PLATELET # BLD AUTO: 282 10*3/MM3 (ref 140–450)
PMV BLD AUTO: 11.3 FL (ref 6–12)
POTASSIUM SERPL-SCNC: 3.6 MMOL/L (ref 3.5–5.2)
PROT SERPL-MCNC: 7.3 G/DL (ref 6–8.5)
QT INTERVAL: 406 MS
QTC INTERVAL: 418 MS
RBC # BLD AUTO: 5.22 10*6/MM3 (ref 3.77–5.28)
SARS-COV-2 RNA PNL SPEC NAA+PROBE: NOT DETECTED
SODIUM SERPL-SCNC: 140 MMOL/L (ref 136–145)
WBC NRBC COR # BLD: 5.07 10*3/MM3 (ref 3.4–10.8)

## 2022-02-16 PROCEDURE — 80053 COMPREHEN METABOLIC PANEL: CPT

## 2022-02-16 PROCEDURE — 85027 COMPLETE CBC AUTOMATED: CPT

## 2022-02-16 PROCEDURE — 36415 COLL VENOUS BLD VENIPUNCTURE: CPT

## 2022-02-16 PROCEDURE — 83690 ASSAY OF LIPASE: CPT

## 2022-02-16 PROCEDURE — 93010 ELECTROCARDIOGRAM REPORT: CPT | Performed by: INTERNAL MEDICINE

## 2022-02-16 PROCEDURE — C9803 HOPD COVID-19 SPEC COLLECT: HCPCS

## 2022-02-16 PROCEDURE — 93005 ELECTROCARDIOGRAM TRACING: CPT

## 2022-02-16 PROCEDURE — U0004 COV-19 TEST NON-CDC HGH THRU: HCPCS

## 2022-02-16 NOTE — PAT
An arrival time for procedure was not given during PAT visit. If patient had any questions or concerns about their arrival time, they were instructed to contact their surgeon/physician.  Additionally, if the patient referred to an arrival time that was acquired from their my chart account, patient was encouraged to verify that time with their surgeon/physician.  NO arrival times given in Pre Admission Testing Department.    COVID test performed in EKG.

## 2022-02-18 PROCEDURE — 88304 TISSUE EXAM BY PATHOLOGIST: CPT | Performed by: SURGERY

## 2022-02-19 ENCOUNTER — LAB REQUISITION (OUTPATIENT)
Dept: LAB | Facility: HOSPITAL | Age: 67
End: 2022-02-19

## 2022-02-19 DIAGNOSIS — K80.20 CALCULUS OF GALLBLADDER WITHOUT CHOLECYSTITIS WITHOUT OBSTRUCTION: ICD-10-CM

## 2022-02-22 LAB
CYTO UR: NORMAL
LAB AP CASE REPORT: NORMAL
LAB AP CLINICAL INFORMATION: NORMAL
PATH REPORT.FINAL DX SPEC: NORMAL
PATH REPORT.GROSS SPEC: NORMAL

## 2022-03-07 ENCOUNTER — HOSPITAL ENCOUNTER (OUTPATIENT)
Dept: MAMMOGRAPHY | Facility: HOSPITAL | Age: 67
Discharge: HOME | End: 2022-03-07
Admitting: FAMILY MEDICINE

## 2022-03-07 DIAGNOSIS — Z12.31 VISIT FOR SCREENING MAMMOGRAM: ICD-10-CM

## 2022-03-07 PROCEDURE — 77063 BREAST TOMOSYNTHESIS BI: CPT | Performed by: RADIOLOGY

## 2022-03-07 PROCEDURE — 77067 SCR MAMMO BI INCL CAD: CPT | Performed by: RADIOLOGY

## 2022-03-07 PROCEDURE — 77067 SCR MAMMO BI INCL CAD: CPT

## 2022-03-07 PROCEDURE — 77063 BREAST TOMOSYNTHESIS BI: CPT

## 2023-01-23 ENCOUNTER — TRANSCRIBE ORDERS (OUTPATIENT)
Dept: ADMINISTRATIVE | Facility: HOSPITAL | Age: 68
End: 2023-01-23
Payer: COMMERCIAL

## 2023-01-23 DIAGNOSIS — Z12.31 SCREENING MAMMOGRAM FOR BREAST CANCER: Primary | ICD-10-CM

## 2023-03-10 ENCOUNTER — HOSPITAL ENCOUNTER (OUTPATIENT)
Dept: MAMMOGRAPHY | Facility: HOSPITAL | Age: 68
Discharge: HOME | End: 2023-03-10
Admitting: FAMILY MEDICINE
Payer: COMMERCIAL

## 2023-03-10 DIAGNOSIS — Z12.31 SCREENING MAMMOGRAM FOR BREAST CANCER: ICD-10-CM

## 2023-03-10 PROCEDURE — 77067 SCR MAMMO BI INCL CAD: CPT

## 2023-03-10 PROCEDURE — 77067 SCR MAMMO BI INCL CAD: CPT | Performed by: RADIOLOGY

## 2023-03-10 PROCEDURE — 77063 BREAST TOMOSYNTHESIS BI: CPT

## 2023-03-10 PROCEDURE — 77063 BREAST TOMOSYNTHESIS BI: CPT | Performed by: RADIOLOGY

## 2024-01-24 ENCOUNTER — TRANSCRIBE ORDERS (OUTPATIENT)
Dept: ADMINISTRATIVE | Facility: HOSPITAL | Age: 69
End: 2024-01-24
Payer: COMMERCIAL

## 2024-01-24 DIAGNOSIS — Z12.31 VISIT FOR SCREENING MAMMOGRAM: Primary | ICD-10-CM

## 2024-03-13 ENCOUNTER — HOSPITAL ENCOUNTER (OUTPATIENT)
Dept: MAMMOGRAPHY | Facility: HOSPITAL | Age: 69
Discharge: HOME | End: 2024-03-13
Admitting: FAMILY MEDICINE
Payer: MEDICARE

## 2024-03-13 DIAGNOSIS — Z12.31 VISIT FOR SCREENING MAMMOGRAM: ICD-10-CM

## 2024-03-13 PROCEDURE — 77063 BREAST TOMOSYNTHESIS BI: CPT

## 2024-03-13 PROCEDURE — 77067 SCR MAMMO BI INCL CAD: CPT

## 2025-03-17 ENCOUNTER — TRANSCRIBE ORDERS (OUTPATIENT)
Dept: ADMINISTRATIVE | Facility: HOSPITAL | Age: 70
End: 2025-03-17
Payer: MEDICARE

## 2025-03-17 DIAGNOSIS — Z12.31 VISIT FOR SCREENING MAMMOGRAM: Primary | ICD-10-CM

## 2025-03-18 LAB
NCCN CRITERIA FLAG: ABNORMAL
TYRER CUZICK SCORE: 5.2

## 2025-03-19 ENCOUNTER — RESULTS FOLLOW-UP (OUTPATIENT)
Facility: HOSPITAL | Age: 70
End: 2025-03-19
Payer: MEDICARE

## 2025-03-21 ENCOUNTER — DOCUMENTATION (OUTPATIENT)
Dept: GENETICS | Facility: HOSPITAL | Age: 70
End: 2025-03-21
Payer: MEDICARE

## 2025-03-21 NOTE — PROGRESS NOTES
This patient recently completed the CARE risk assessment for a mammogram appointment. Based on the patient's responses, NCCN criteria for genetic testing was met. At the time of the assessment, the patient was provided with both written and video educational materials regarding genetic testing.    Navigator follow-up:   Upon completion of the assessment, the patient declined genetic testing. I sent the patient information regarding testing and my contact information, should they change their mind in the future.

## 2025-04-02 ENCOUNTER — HOSPITAL ENCOUNTER (OUTPATIENT)
Dept: MAMMOGRAPHY | Facility: HOSPITAL | Age: 70
Discharge: HOME | End: 2025-04-02
Admitting: FAMILY MEDICINE
Payer: MEDICARE

## 2025-04-02 DIAGNOSIS — Z12.31 VISIT FOR SCREENING MAMMOGRAM: ICD-10-CM

## 2025-04-02 PROCEDURE — 77063 BREAST TOMOSYNTHESIS BI: CPT

## 2025-04-02 PROCEDURE — 77067 SCR MAMMO BI INCL CAD: CPT

## (undated) DEVICE — SOL POVIDONE IODINE 10PCT 3/4OZ STRL

## (undated) DEVICE — ST NERV BLCK CONT CONTIPLEX ECHO CLSD 18G 4IN

## (undated) DEVICE — NERVE BLOCK SUPPORT KIT/BLUE: Brand: MEDLINE INDUSTRIES, INC.

## (undated) DEVICE — UNDERCAST PADDING: Brand: DEROYAL

## (undated) DEVICE — DRSNG PAD ABD 8X10IN STRL

## (undated) DEVICE — ST EXT MICROBORE FIX M LL 38IN

## (undated) DEVICE — SOL LR 1000ML

## (undated) DEVICE — GLV SURG SIGNATURE TOUCH PF LTX 8 STRL BX/50

## (undated) DEVICE — NDL HYPO ECLPS SFTY 18G 1 1/2IN

## (undated) DEVICE — PK KN TOTL 10

## (undated) DEVICE — SPNG GZ WOVN 4X4IN 12PLY 10/BX STRL

## (undated) DEVICE — TRY EPID SFTY 18G 3.5IN 1T7680

## (undated) DEVICE — ANTIBACTERIAL UNDYED BRAIDED (POLYGLACTIN 910), SYNTHETIC ABSORBABLE SUTURE: Brand: COATED VICRYL

## (undated) DEVICE — STRYKER PERFORMANCE SERIES SAGITTAL BLADE: Brand: STRYKER PERFORMANCE SERIES

## (undated) DEVICE — KT PUMP PAIN ONQ CBLOC SELCTAFLO 400ML

## (undated) DEVICE — SYS SKIN CLS DERMABOND PRINEO W/22CM MESH TP

## (undated) DEVICE — ENCORE® LATEX MICRO SIZE 8, STERILE LATEX POWDER-FREE SURGICAL GLOVE: Brand: ENCORE

## (undated) DEVICE — SYR LUERLOK 50ML

## (undated) DEVICE — SIGMA LCS HIGH PERFORMANCE STERILE THREADED HEADED PINS: Brand: SIGMA LCS HIGH PERFORMANCE

## (undated) DEVICE — CANN NASL CO2 DIVIDED A/

## (undated) DEVICE — RECIPROCATING BLADE, DOUBLE SIDED, OFFSET  (70.0 X 0.8 X 12.5MM)

## (undated) DEVICE — BNDG ELAS ELITE V/CLOSE 6IN 5YD LF STRL

## (undated) DEVICE — SIGMA LCS HIGH PERFORMANCE INSTRUMENTS STERILE THREADED PINS: Brand: SIGMA LCS HIGH PERFORMANCE